# Patient Record
Sex: MALE | Race: WHITE | NOT HISPANIC OR LATINO | Employment: FULL TIME
[De-identification: names, ages, dates, MRNs, and addresses within clinical notes are randomized per-mention and may not be internally consistent; named-entity substitution may affect disease eponyms.]

---

## 2017-04-04 ENCOUNTER — HOSPITAL (OUTPATIENT)
Dept: OTHER | Age: 32
End: 2017-04-04
Attending: EMERGENCY MEDICINE

## 2017-04-05 ENCOUNTER — DIAGNOSTIC TRANS (OUTPATIENT)
Dept: OTHER | Age: 32
End: 2017-04-05

## 2017-05-03 ENCOUNTER — TELEPHONE (OUTPATIENT)
Dept: NEPHROLOGY | Facility: CLINIC | Age: 32
End: 2017-05-03

## 2017-05-05 ENCOUNTER — LAB ENCOUNTER (OUTPATIENT)
Dept: LAB | Facility: HOSPITAL | Age: 32
End: 2017-05-05
Attending: INTERNAL MEDICINE
Payer: COMMERCIAL

## 2017-05-05 ENCOUNTER — OFFICE VISIT (OUTPATIENT)
Dept: NEPHROLOGY | Facility: CLINIC | Age: 32
End: 2017-05-05

## 2017-05-05 ENCOUNTER — HOSPITAL ENCOUNTER (OUTPATIENT)
Dept: CT IMAGING | Facility: HOSPITAL | Age: 32
Discharge: HOME | End: 2017-05-05
Attending: INTERNAL MEDICINE
Payer: COMMERCIAL

## 2017-05-05 VITALS
SYSTOLIC BLOOD PRESSURE: 126 MMHG | DIASTOLIC BLOOD PRESSURE: 77 MMHG | WEIGHT: 186.81 LBS | HEIGHT: 73 IN | BODY MASS INDEX: 24.76 KG/M2 | HEART RATE: 61 BPM | TEMPERATURE: 98 F

## 2017-05-05 DIAGNOSIS — R10.84 GENERALIZED ABDOMINAL PAIN: Primary | ICD-10-CM

## 2017-05-05 DIAGNOSIS — R10.84 GENERALIZED ABDOMINAL PAIN: ICD-10-CM

## 2017-05-05 DIAGNOSIS — G40.909 SEIZURE DISORDER (HCC): ICD-10-CM

## 2017-05-05 DIAGNOSIS — G40.824 INFANTILE SPASMS WITH INTRACTABLE EPILEPSY (HCC): Primary | ICD-10-CM

## 2017-05-05 PROCEDURE — 99212 OFFICE O/P EST SF 10 MIN: CPT | Performed by: INTERNAL MEDICINE

## 2017-05-05 PROCEDURE — 80048 BASIC METABOLIC PNL TOTAL CA: CPT

## 2017-05-05 PROCEDURE — 99215 OFFICE O/P EST HI 40 MIN: CPT | Performed by: INTERNAL MEDICINE

## 2017-05-05 PROCEDURE — 36415 COLL VENOUS BLD VENIPUNCTURE: CPT

## 2017-05-05 PROCEDURE — 82565 ASSAY OF CREATININE: CPT

## 2017-05-05 PROCEDURE — 74177 CT ABD & PELVIS W/CONTRAST: CPT | Performed by: INTERNAL MEDICINE

## 2017-05-05 RX ORDER — BRIVARACETAM 50 MG/1
50 TABLET, FILM COATED ORAL NIGHTLY
Refills: 5 | COMMUNITY
Start: 2017-04-26

## 2017-05-05 RX ORDER — LEVETIRACETAM 500 MG
500 TABLET, EXTENDED RELEASE 24 HR ORAL EVERY OTHER DAY
Refills: 5 | COMMUNITY
Start: 2017-02-22 | End: 2021-04-01 | Stop reason: ALTCHOICE

## 2017-05-05 NOTE — PATIENT INSTRUCTIONS
1. Do blood test  Non fasting     and cat scan of abdomen. .   Do tonight or tomorrow am     call me four hours later for results.       [phone]

## 2017-05-11 NOTE — PROGRESS NOTES
NEPHROLOGY PROGRESS NOTE  Eli Rose     MRN:  [id number]   Date of Service:  5/5/17     HISTORY OF PRESENT ILLNESS: The patient is here. He is a 34-year-old white male. He has epilepsy.  He comes in with nausea, no vomiting, but not feeling well, and const do the best for the pt. . And save money to the health care system, in these times of health care crunch. Guadalupe Riedel

## 2017-05-20 ENCOUNTER — TELEPHONE (OUTPATIENT)
Dept: NEPHROLOGY | Facility: CLINIC | Age: 32
End: 2017-05-20

## 2017-05-21 NOTE — TELEPHONE ENCOUNTER
Notified of low sodium,  It is due to seizure meds. Erica Meng  He will discuss w his neurologist.. Bowels much improved

## 2017-06-19 ENCOUNTER — APPOINTMENT (OUTPATIENT)
Dept: LAB | Facility: HOSPITAL | Age: 32
End: 2017-06-19
Attending: INTERNAL MEDICINE
Payer: COMMERCIAL

## 2017-06-19 ENCOUNTER — OFFICE VISIT (OUTPATIENT)
Dept: NEPHROLOGY | Facility: CLINIC | Age: 32
End: 2017-06-19

## 2017-06-19 VITALS
BODY MASS INDEX: 25.27 KG/M2 | WEIGHT: 190.63 LBS | SYSTOLIC BLOOD PRESSURE: 127 MMHG | DIASTOLIC BLOOD PRESSURE: 79 MMHG | HEIGHT: 73 IN | HEART RATE: 64 BPM

## 2017-06-19 DIAGNOSIS — E87.1 HYPONATREMIA: Primary | ICD-10-CM

## 2017-06-19 DIAGNOSIS — E87.1 HYPONATREMIA: ICD-10-CM

## 2017-06-19 DIAGNOSIS — G40.909 NONINTRACTABLE EPILEPSY WITHOUT STATUS EPILEPTICUS, UNSPECIFIED EPILEPSY TYPE (HCC): ICD-10-CM

## 2017-06-19 PROCEDURE — 36415 COLL VENOUS BLD VENIPUNCTURE: CPT

## 2017-06-19 PROCEDURE — 99213 OFFICE O/P EST LOW 20 MIN: CPT | Performed by: INTERNAL MEDICINE

## 2017-06-19 PROCEDURE — 99212 OFFICE O/P EST SF 10 MIN: CPT | Performed by: INTERNAL MEDICINE

## 2017-06-19 PROCEDURE — 80048 BASIC METABOLIC PNL TOTAL CA: CPT

## 2017-06-20 NOTE — PROGRESS NOTES
NEPHROLOGY PROGRESS NOTE  Carmelina De La Rosa     MRN:  [id number]   Date of Service:  6/19/17     HISTORY OF PRESENT ILLNESS: The patient is a 48-year-old white male with epilepsy. He is doing well. He is now off Keppra, which has helped.  He is currently taking Tr

## 2017-06-21 ENCOUNTER — TELEPHONE (OUTPATIENT)
Dept: NEPHROLOGY | Facility: CLINIC | Age: 32
End: 2017-06-21

## 2017-06-21 NOTE — TELEPHONE ENCOUNTER
Contacted pt and advised him to remain on salt tablets once daily since his sodium level was WNL at 136 per [facility]. He stated understanding.

## 2017-07-24 ENCOUNTER — TELEPHONE (OUTPATIENT)
Dept: NEPHROLOGY | Facility: CLINIC | Age: 32
End: 2017-07-24

## 2017-07-24 NOTE — TELEPHONE ENCOUNTER
Patient was seen by Dr. Yesi Molina on 5/5/17 and CT was done on 5/5/17. I dont think a Pre Cert was every initiated by this office. The nursing staff was probably not informed that patient was having this test done.  We would only know if Insurance verification

## 2017-07-24 NOTE — TELEPHONE ENCOUNTER
Olivia/Pt Accounts at [address] [place] [address] called re: prior auth for 5/5/17 CT. She states that prior auth still shows as \"open\". Please call.

## 2017-07-25 NOTE — TELEPHONE ENCOUNTER
This was an emergency ct scan for abd pain at end of day.   Nursing had already left their station and I didn't know about PA

## 2017-07-25 NOTE — TELEPHONE ENCOUNTER
Spoke to Vijaya in billing.  CT scan was done without pre certification and insurance is not paying for the test. There needs to be a reason why patient had this test done the same day he was seen in the office but there is no office note indicating that t

## 2017-07-27 NOTE — TELEPHONE ENCOUNTER
[address] insurance contacted to initiate a retro certification for CT abdomen + pelvis done on 5/5/17. Insurance requesing clinical information. Do you want to addend the progress note to include more details about the symptoms?

## 2017-11-22 ENCOUNTER — OFFICE VISIT (OUTPATIENT)
Dept: NEPHROLOGY | Facility: CLINIC | Age: 32
End: 2017-11-22

## 2017-11-22 VITALS
BODY MASS INDEX: 25.87 KG/M2 | SYSTOLIC BLOOD PRESSURE: 117 MMHG | HEIGHT: 73 IN | HEART RATE: 71 BPM | DIASTOLIC BLOOD PRESSURE: 71 MMHG | WEIGHT: 195.19 LBS

## 2017-11-22 DIAGNOSIS — E87.1 HYPONATREMIA: Primary | ICD-10-CM

## 2017-11-22 PROCEDURE — 99212 OFFICE O/P EST SF 10 MIN: CPT | Performed by: INTERNAL MEDICINE

## 2017-11-22 NOTE — PROGRESS NOTES
Annie Franks is here for follow-up he has history of epilepsy and hyponatremia. I reviewed his labs and his sodium is excellent at 139 creatinine 0.86.   He had a workup for hematuria in the past everything was negative and his renal functions fine denies blood in

## 2017-12-27 ENCOUNTER — OFFICE VISIT (OUTPATIENT)
Dept: INTERNAL MEDICINE CLINIC | Facility: CLINIC | Age: 32
End: 2017-12-27

## 2017-12-27 VITALS
SYSTOLIC BLOOD PRESSURE: 105 MMHG | BODY MASS INDEX: 25.05 KG/M2 | HEART RATE: 61 BPM | DIASTOLIC BLOOD PRESSURE: 69 MMHG | HEIGHT: 73 IN | TEMPERATURE: 98 F | WEIGHT: 189 LBS

## 2017-12-27 DIAGNOSIS — Z02.1 PHYSICAL EXAM, PRE-EMPLOYMENT: Primary | ICD-10-CM

## 2017-12-27 DIAGNOSIS — G40.909 SEIZURE DISORDER (HCC): ICD-10-CM

## 2017-12-27 PROCEDURE — 99385 PREV VISIT NEW AGE 18-39: CPT | Performed by: INTERNAL MEDICINE

## 2017-12-27 NOTE — PROGRESS NOTES
Patient ID: Flavia Fields is a 34year old male. Patient presents with:  Physical: Form needed for work. HISTORY OF PRESENT ILLNESS:   HPI  Patient presents for above.   Here for preemployment physical.  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

## 2017-12-27 NOTE — PATIENT INSTRUCTIONS
Living Well with Epilepsy  People with epilepsy can lead healthy, productive lives. Life with epilepsy can be challenging, but there are things you can do to make it easier. For example, you can pay attention to your emotions.  If you feel down, upset, or Epilepsy affects those around you, too. Talk with your loved ones and learn their concerns. For instance, your children may be afraid for your safety. Reassure them that you can live a long, healthy life with epilepsy.  Your partner may wonder if a normal s

## 2018-12-26 ENCOUNTER — HOSPITAL (OUTPATIENT)
Dept: OTHER | Age: 33
End: 2018-12-26
Attending: INTERNAL MEDICINE

## 2018-12-26 LAB
ALBUMIN SERPL-MCNC: 3.9 GM/DL (ref 3.6–5.1)
ALBUMIN/GLOB SERPL: 1.1 {RATIO} (ref 1–2.4)
ALP SERPL-CCNC: 79 UNIT/L (ref 45–117)
ALT SERPL-CCNC: 25 UNIT/L
ANALYZER ANC (IANC): ABNORMAL
ANION GAP SERPL CALC-SCNC: 15 MMOL/L (ref 10–20)
AST SERPL-CCNC: 22 UNIT/L
BASOPHILS # BLD: 0 THOUSAND/MCL (ref 0–0.3)
BASOPHILS NFR BLD: 0 %
BILIRUB SERPL-MCNC: 0.6 MG/DL (ref 0.2–1)
BUN SERPL-MCNC: 15 MG/DL (ref 6–20)
BUN/CREAT SERPL: 16 (ref 7–25)
CALCIUM SERPL-MCNC: 8.6 MG/DL (ref 8.4–10.2)
CHLORIDE: 102 MMOL/L (ref 98–107)
CO2 SERPL-SCNC: 26 MMOL/L (ref 21–32)
CREAT SERPL-MCNC: 0.94 MG/DL (ref 0.67–1.17)
DIFFERENTIAL METHOD BLD: ABNORMAL
EOSINOPHIL # BLD: 0.1 THOUSAND/MCL (ref 0.1–0.5)
EOSINOPHIL NFR BLD: 1 %
ERYTHROCYTE [DISTWIDTH] IN BLOOD: 11.9 % (ref 11–15)
GLOBULIN SER-MCNC: 3.6 GM/DL (ref 2–4)
GLUCOSE SERPL-MCNC: 112 MG/DL (ref 65–99)
HEMATOCRIT: 45.8 % (ref 39–51)
HGB BLD-MCNC: 15.8 GM/DL (ref 13–17)
IMM GRANULOCYTES # BLD AUTO: 0 THOUSAND/MCL (ref 0–0.2)
IMM GRANULOCYTES NFR BLD: 0 %
LIPASE SERPL-CCNC: 114 UNIT/L (ref 73–393)
LYMPHOCYTES # BLD: 0.3 THOUSAND/MCL (ref 1–4.8)
LYMPHOCYTES NFR BLD: 3 %
MCH RBC QN AUTO: 31.2 PG (ref 26–34)
MCHC RBC AUTO-ENTMCNC: 34.5 GM/DL (ref 32–36.5)
MCV RBC AUTO: 90.5 FL (ref 78–100)
MONOCYTES # BLD: 0.6 THOUSAND/MCL (ref 0.3–0.9)
MONOCYTES NFR BLD: 6 %
NEUTROPHILS # BLD: 8.2 THOUSAND/MCL (ref 1.8–7.7)
NEUTROPHILS NFR BLD: 90 %
NEUTS SEG NFR BLD: ABNORMAL %
NRBC (NRBCRE): 0 /100 WBC
PLATELET # BLD: 147 THOUSAND/MCL (ref 140–450)
POTASSIUM SERPL-SCNC: 4.2 MMOL/L (ref 3.4–5.1)
PROT SERPL-MCNC: 7.5 GM/DL (ref 6.4–8.2)
RBC # BLD: 5.06 MILLION/MCL (ref 4.5–5.9)
SODIUM SERPL-SCNC: 139 MMOL/L (ref 135–145)
WBC # BLD: 9.2 THOUSAND/MCL (ref 4.2–11)

## 2018-12-27 ENCOUNTER — HOSPITAL (OUTPATIENT)
Dept: OTHER | Age: 33
End: 2018-12-27

## 2018-12-27 ENCOUNTER — DIAGNOSTIC TRANS (OUTPATIENT)
Dept: OTHER | Age: 33
End: 2018-12-27

## 2018-12-27 LAB
ALBUMIN SERPL-MCNC: 2.7 GM/DL (ref 3.6–5.1)
ALBUMIN/GLOB SERPL: 0.9 {RATIO} (ref 1–2.4)
ALP SERPL-CCNC: 63 UNIT/L (ref 45–117)
ALT SERPL-CCNC: 17 UNIT/L
ANALYZER ANC (IANC): ABNORMAL
ANION GAP SERPL CALC-SCNC: 12 MMOL/L (ref 10–20)
AST SERPL-CCNC: 14 UNIT/L
BASOPHILS # BLD: 0 THOUSAND/MCL (ref 0–0.3)
BASOPHILS NFR BLD: 0 %
BILIRUB SERPL-MCNC: 0.5 MG/DL (ref 0.2–1)
BUN SERPL-MCNC: 8 MG/DL (ref 6–20)
BUN/CREAT SERPL: 10 (ref 7–25)
CALCIUM SERPL-MCNC: 8.1 MG/DL (ref 8.4–10.2)
CHLORIDE: 103 MMOL/L (ref 98–107)
CO2 SERPL-SCNC: 26 MMOL/L (ref 21–32)
CREAT SERPL-MCNC: 0.82 MG/DL (ref 0.67–1.17)
DIFFERENTIAL METHOD BLD: ABNORMAL
EOSINOPHIL # BLD: 0 THOUSAND/MCL (ref 0.1–0.5)
EOSINOPHIL NFR BLD: 1 %
ERYTHROCYTE [DISTWIDTH] IN BLOOD: 11.9 % (ref 11–15)
GLOBULIN SER-MCNC: 3.1 GM/DL (ref 2–4)
GLUCOSE SERPL-MCNC: 114 MG/DL (ref 65–99)
HEMATOCRIT: 39.7 % (ref 39–51)
HGB BLD-MCNC: 13.4 GM/DL (ref 13–17)
IMM GRANULOCYTES # BLD AUTO: 0 THOUSAND/MCL (ref 0–0.2)
IMM GRANULOCYTES NFR BLD: 0 %
LYMPHOCYTES # BLD: 0.8 THOUSAND/MCL (ref 1–4.8)
LYMPHOCYTES NFR BLD: 24 %
MAGNESIUM SERPL-MCNC: 1.8 MG/DL (ref 1.7–2.4)
MCH RBC QN AUTO: 30.7 PG (ref 26–34)
MCHC RBC AUTO-ENTMCNC: 33.8 GM/DL (ref 32–36.5)
MCV RBC AUTO: 90.8 FL (ref 78–100)
MONOCYTES # BLD: 0.4 THOUSAND/MCL (ref 0.3–0.9)
MONOCYTES NFR BLD: 13 %
NEUTROPHILS # BLD: 2.1 THOUSAND/MCL (ref 1.8–7.7)
NEUTROPHILS NFR BLD: 62 %
NEUTS SEG NFR BLD: ABNORMAL %
NRBC (NRBCRE): 0 /100 WBC
OXCARBAZEPINE (OXCARB): 23.1
PLATELET # BLD: 113 THOUSAND/MCL (ref 140–450)
POTASSIUM SERPL-SCNC: 3.4 MMOL/L (ref 3.4–5.1)
PROT SERPL-MCNC: 5.8 GM/DL (ref 6.4–8.2)
RBC # BLD: 4.37 MILLION/MCL (ref 4.5–5.9)
SODIUM SERPL-SCNC: 138 MMOL/L (ref 135–145)
WBC # BLD: 3.5 THOUSAND/MCL (ref 4.2–11)
WBC STL QL MICRO: POSITIVE

## 2019-09-20 ENCOUNTER — HOSPITAL (OUTPATIENT)
Dept: OTHER | Age: 34
End: 2019-09-20
Attending: EMERGENCY MEDICINE

## 2020-01-08 ENCOUNTER — HOSPITAL (OUTPATIENT)
Dept: OTHER | Age: 35
End: 2020-01-08
Attending: EMERGENCY MEDICINE

## 2020-03-11 ENCOUNTER — WALK IN (OUTPATIENT)
Dept: URGENT CARE | Age: 35
End: 2020-03-11
Attending: EMERGENCY MEDICINE

## 2020-03-11 DIAGNOSIS — J02.0 STREP THROAT: Primary | ICD-10-CM

## 2020-03-11 LAB
FLUAV AG UPPER RESP QL IA.RAPID: NEGATIVE
FLUBV AG UPPER RESP QL IA.RAPID: NEGATIVE
INTERNAL PROCEDURAL CONTROLS ACCEPTABLE: YES
S PYO AG THROAT QL IA.RAPID: POSITIVE

## 2020-03-11 PROCEDURE — 87804 INFLUENZA ASSAY W/OPTIC: CPT | Performed by: EMERGENCY MEDICINE

## 2020-03-11 PROCEDURE — 87880 STREP A ASSAY W/OPTIC: CPT | Performed by: EMERGENCY MEDICINE

## 2020-03-11 PROCEDURE — 99212 OFFICE O/P EST SF 10 MIN: CPT

## 2020-03-11 RX ORDER — LORAZEPAM 0.5 MG/1
0.5 TABLET ORAL
COMMUNITY
Start: 2016-04-05

## 2020-03-11 RX ORDER — CLINDAMYCIN HYDROCHLORIDE 300 MG/1
300 CAPSULE ORAL 3 TIMES DAILY
Qty: 30 CAPSULE | Refills: 0 | Status: SHIPPED | OUTPATIENT
Start: 2020-03-11 | End: 2020-03-21

## 2020-03-11 ASSESSMENT — PAIN SCALES - GENERAL: PAINLEVEL: 5-6

## 2020-03-13 ENCOUNTER — HOSPITAL ENCOUNTER (EMERGENCY)
Age: 35
Discharge: HOME | End: 2020-03-13
Attending: EMERGENCY MEDICINE

## 2020-03-13 ENCOUNTER — HOSPITAL ENCOUNTER (OUTPATIENT)
Dept: GENERAL RADIOLOGY | Age: 35
Discharge: HOME | End: 2020-03-13
Attending: EMERGENCY MEDICINE

## 2020-03-13 ENCOUNTER — WALK IN (OUTPATIENT)
Dept: URGENT CARE | Age: 35
End: 2020-03-13
Attending: NURSE PRACTITIONER

## 2020-03-13 VITALS
SYSTOLIC BLOOD PRESSURE: 138 MMHG | RESPIRATION RATE: 16 BRPM | HEART RATE: 69 BPM | BODY MASS INDEX: 25.1 KG/M2 | WEIGHT: 195.55 LBS | HEIGHT: 74 IN | DIASTOLIC BLOOD PRESSURE: 74 MMHG | TEMPERATURE: 98.9 F | OXYGEN SATURATION: 99 %

## 2020-03-13 DIAGNOSIS — J18.9 PNEUMONIA OF RIGHT UPPER LOBE DUE TO INFECTIOUS ORGANISM: Primary | ICD-10-CM

## 2020-03-13 DIAGNOSIS — J18.9 PNEUMONIA, UNSPECIFIED ORGANISM: Primary | ICD-10-CM

## 2020-03-13 DIAGNOSIS — R05.9 COUGH: ICD-10-CM

## 2020-03-13 LAB
ANION GAP SERPL CALC-SCNC: 9 MMOL/L (ref 10–20)
BASOPHILS # BLD: 0 K/MCL (ref 0–0.3)
BASOPHILS NFR BLD: 0 %
BUN SERPL-MCNC: 8 MG/DL (ref 6–20)
BUN/CREAT SERPL: 11 (ref 7–25)
CALCIUM SERPL-MCNC: 8.7 MG/DL (ref 8.4–10.2)
CHLORIDE SERPL-SCNC: 102 MMOL/L (ref 98–107)
CO2 SERPL-SCNC: 28 MMOL/L (ref 21–32)
CREAT SERPL-MCNC: 0.73 MG/DL (ref 0.67–1.17)
DIFFERENTIAL METHOD BLD: ABNORMAL
EOSINOPHIL # BLD: 0.2 K/MCL (ref 0.1–0.5)
EOSINOPHIL NFR BLD: 2 %
ERYTHROCYTE [DISTWIDTH] IN BLOOD: 12.1 % (ref 11–15)
FLUAV RNA SPEC QL NAA+PROBE: NOT DETECTED
FLUBV RNA SPEC QL NAA+PROBE: NOT DETECTED
GLUCOSE SERPL-MCNC: 139 MG/DL (ref 65–99)
HCT VFR BLD CALC: 38.6 % (ref 39–51)
HGB BLD-MCNC: 13 G/DL (ref 13–17)
IMM GRANULOCYTES # BLD AUTO: 0 K/MCL (ref 0–0.2)
IMM GRANULOCYTES NFR BLD: 0 %
LYMPHOCYTES # BLD: 1.2 K/MCL (ref 1–4.8)
LYMPHOCYTES NFR BLD: 15 %
MAGNESIUM SERPL-MCNC: 2.2 MG/DL (ref 1.7–2.4)
MCH RBC QN AUTO: 30.2 PG (ref 26–34)
MCHC RBC AUTO-ENTMCNC: 33.7 G/DL (ref 32–36.5)
MCV RBC AUTO: 89.6 FL (ref 78–100)
MONOCYTES # BLD: 1.2 K/MCL (ref 0.3–0.9)
MONOCYTES NFR BLD: 15 %
NEUTROPHILS # BLD: 5.2 K/MCL (ref 1.8–7.7)
NEUTROPHILS NFR BLD: 68 %
NRBC BLD MANUAL-RTO: 0 /100 WBC
PLATELET # BLD: 179 K/MCL (ref 140–450)
POTASSIUM SERPL-SCNC: 3.8 MMOL/L (ref 3.4–5.1)
PROCALCITONIN SERPL IA-MCNC: <0.05 NG/ML
RBC # BLD: 4.31 MIL/MCL (ref 4.5–5.9)
SODIUM SERPL-SCNC: 135 MMOL/L (ref 135–145)
SPECIMEN SOURCE: NORMAL
WBC # BLD: 7.7 K/MCL (ref 4.2–11)

## 2020-03-13 PROCEDURE — 84145 PROCALCITONIN (PCT): CPT

## 2020-03-13 PROCEDURE — 96361 HYDRATE IV INFUSION ADD-ON: CPT

## 2020-03-13 PROCEDURE — 80048 BASIC METABOLIC PNL TOTAL CA: CPT

## 2020-03-13 PROCEDURE — 10002807 HB RX 258: Performed by: EMERGENCY MEDICINE

## 2020-03-13 PROCEDURE — 99284 EMERGENCY DEPT VISIT MOD MDM: CPT

## 2020-03-13 PROCEDURE — 85025 COMPLETE CBC W/AUTO DIFF WBC: CPT

## 2020-03-13 PROCEDURE — 83735 ASSAY OF MAGNESIUM: CPT

## 2020-03-13 PROCEDURE — 99214 OFFICE O/P EST MOD 30 MIN: CPT

## 2020-03-13 PROCEDURE — 71046 X-RAY EXAM CHEST 2 VIEWS: CPT

## 2020-03-13 PROCEDURE — 87502 INFLUENZA DNA AMP PROBE: CPT

## 2020-03-13 PROCEDURE — 93005 ELECTROCARDIOGRAM TRACING: CPT | Performed by: EMERGENCY MEDICINE

## 2020-03-13 PROCEDURE — 87040 BLOOD CULTURE FOR BACTERIA: CPT

## 2020-03-13 PROCEDURE — 10002803 HB RX 637: Performed by: EMERGENCY MEDICINE

## 2020-03-13 PROCEDURE — 96360 HYDRATION IV INFUSION INIT: CPT

## 2020-03-13 RX ORDER — IBUPROFEN 600 MG/1
600 TABLET ORAL ONCE
Status: COMPLETED | OUTPATIENT
Start: 2020-03-13 | End: 2020-03-13

## 2020-03-13 RX ORDER — CEFUROXIME AXETIL 500 MG/1
500 TABLET ORAL 2 TIMES DAILY
Qty: 18 TABLET | Refills: 0 | Status: SHIPPED | OUTPATIENT
Start: 2020-03-13 | End: 2020-03-22

## 2020-03-13 RX ADMIN — IBUPROFEN 600 MG: 600 TABLET, FILM COATED ORAL at 13:46

## 2020-03-13 RX ADMIN — SODIUM CHLORIDE 1000 ML: 0.9 INJECTION, SOLUTION INTRAVENOUS at 15:45

## 2020-03-13 SDOH — HEALTH STABILITY: MENTAL HEALTH: HOW OFTEN DO YOU HAVE A DRINK CONTAINING ALCOHOL?: NEVER

## 2020-03-13 ASSESSMENT — PAIN SCALES - GENERAL
PAINLEVEL_OUTOF10: 5
PAINLEVEL: 5-6

## 2020-03-13 ASSESSMENT — ENCOUNTER SYMPTOMS
DIZZINESS: 0
FEVER: 1
AGITATION: 0
FEVER: 0
NUMBNESS: 0
EYE PAIN: 0
HALLUCINATIONS: 0
SORE THROAT: 0
EYE REDNESS: 0
COUGH: 1
DIARRHEA: 1
SHORTNESS OF BREATH: 0
BRUISES/BLEEDS EASILY: 0
ACTIVITY CHANGE: 0
HEADACHES: 0
COUGH: 1
ABDOMINAL PAIN: 0
BACK PAIN: 0
ABDOMINAL DISTENTION: 0
DIZZINESS: 1
RHINORRHEA: 0
COLOR CHANGE: 0
CHILLS: 0
ADENOPATHY: 0
ABDOMINAL PAIN: 0
VOMITING: 0
APNEA: 0
WEAKNESS: 0
BACK PAIN: 0

## 2020-03-13 ASSESSMENT — PAIN DESCRIPTION - PAIN TYPE: TYPE: ACUTE PAIN

## 2020-03-14 LAB
ATRIAL RATE (BPM): 68
P AXIS (DEGREES): 41
PR-INTERVAL (MSEC): 148
QRS-INTERVAL (MSEC): 84
QT-INTERVAL (MSEC): 388
QTC: 413
R AXIS (DEGREES): 51
REPORT TEXT: NORMAL
T AXIS (DEGREES): 32
VENTRICULAR RATE EKG/MIN (BPM): 68

## 2020-03-16 ENCOUNTER — TELEPHONE (OUTPATIENT)
Dept: NEPHROLOGY | Facility: CLINIC | Age: 35
End: 2020-03-16

## 2020-03-16 NOTE — TELEPHONE ENCOUNTER
Pt states he was in the ER at [facility] on Friday and discharged the same day. Pt requesting an appt this week with Dr. Jeremy Lilly.  Please call [phone]

## 2020-03-18 ENCOUNTER — OFFICE VISIT (OUTPATIENT)
Dept: NEPHROLOGY | Facility: CLINIC | Age: 35
End: 2020-03-18
Payer: COMMERCIAL

## 2020-03-18 VITALS
BODY MASS INDEX: 26 KG/M2 | HEART RATE: 74 BPM | TEMPERATURE: 97 F | WEIGHT: 197.63 LBS | DIASTOLIC BLOOD PRESSURE: 80 MMHG | SYSTOLIC BLOOD PRESSURE: 141 MMHG

## 2020-03-18 DIAGNOSIS — J18.9 PNEUMONIA DUE TO INFECTIOUS ORGANISM, UNSPECIFIED LATERALITY, UNSPECIFIED PART OF LUNG: Primary | ICD-10-CM

## 2020-03-18 LAB
BACTERIA BLD CULT: NORMAL
BACTERIA BLD CULT: NORMAL
REPORT STATUS (RPT): NORMAL
REPORT STATUS (RPT): NORMAL
SPECIMEN SOURCE: NORMAL
SPECIMEN SOURCE: NORMAL

## 2020-03-18 PROCEDURE — 99214 OFFICE O/P EST MOD 30 MIN: CPT | Performed by: INTERNAL MEDICINE

## 2020-03-18 PROCEDURE — 99212 OFFICE O/P EST SF 10 MIN: CPT | Performed by: INTERNAL MEDICINE

## 2020-03-18 RX ORDER — CEFUROXIME AXETIL 500 MG/1
500 TABLET ORAL 2 TIMES DAILY
COMMUNITY
Start: 2020-03-13 | End: 2020-03-22

## 2020-03-18 NOTE — PATIENT INSTRUCTIONS
Please self isolate until Saturday or Sunday    Do not have any contact with any people except family members that have already been exposed    Do not invite anyone over    I am not sure if you had coronavirus it is possible but please finish your antibiot

## 2020-03-19 NOTE — PROGRESS NOTES
Byron Luna is here as a history of renal insufficiency and seizure disorder he is here he just got over about a days of being sick with fever up to 102 was seen in the ER had a rapid strep that was positive was placed on clindamycin felt better but fever continu

## 2020-10-05 ENCOUNTER — TELEPHONE (OUTPATIENT)
Dept: NEPHROLOGY | Facility: CLINIC | Age: 35
End: 2020-10-05

## 2020-10-05 NOTE — TELEPHONE ENCOUNTER
Pt called to speak to Dr. Aleja Leonard about a note that he needs to return to work. He states he was tested for Covid because of possible exposure. Test was negative but he needs note. Please call.

## 2020-10-05 NOTE — TELEPHONE ENCOUNTER
Patient contacted. 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 His Covid-19 test came back negative so he wants to return to work on 10/12/2020.  Work release note faxed to [employer] at

## 2020-11-05 ENCOUNTER — TELEPHONE (OUTPATIENT)
Dept: NEPHROLOGY | Facility: CLINIC | Age: 35
End: 2020-11-05

## 2020-11-05 DIAGNOSIS — Z20.822 CLOSE EXPOSURE TO COVID-19 VIRUS: Primary | ICD-10-CM

## 2020-11-05 NOTE — TELEPHONE ENCOUNTER
Pt asking for order for covid test - had exposure at his work on Monday - no sx , but pt has epilepsy

## 2020-11-06 ENCOUNTER — TELEPHONE (OUTPATIENT)
Dept: NEPHROLOGY | Facility: CLINIC | Age: 35
End: 2020-11-06

## 2020-11-06 NOTE — TELEPHONE ENCOUNTER
Pt had covid test done today and was told results will be in on Tuesday - asking if he needs to quarantine until he gets the results

## 2020-11-10 NOTE — TELEPHONE ENCOUNTER
Patient contacted. He is aware of Video visit with Dr. Chris Osborne scheduled for Wednesday 11/11/2020 after 6PM. He states he has self quarentened and will need a note to return to work. His test done at [facility] office was negative.

## 2020-11-10 NOTE — TELEPHONE ENCOUNTER
Pt requesting a call back regarding covid test results, issues with school and quarantine questions. Pt states situation at school has caused  An increase in anxiety and epileptic auras. unable to reach RN  Please call [phone] or cell [phone]

## 2020-11-11 ENCOUNTER — TELEMEDICINE (OUTPATIENT)
Dept: NEPHROLOGY | Facility: CLINIC | Age: 35
End: 2020-11-11
Payer: COMMERCIAL

## 2020-11-11 DIAGNOSIS — G40.909 NONINTRACTABLE EPILEPSY WITHOUT STATUS EPILEPTICUS, UNSPECIFIED EPILEPSY TYPE (HCC): ICD-10-CM

## 2020-11-11 DIAGNOSIS — G40.909 SEIZURE DISORDER (HCC): ICD-10-CM

## 2020-11-11 DIAGNOSIS — Z20.822 CLOSE EXPOSURE TO COVID-19 VIRUS: Primary | ICD-10-CM

## 2020-11-11 PROCEDURE — 99213 OFFICE O/P EST LOW 20 MIN: CPT | Performed by: INTERNAL MEDICINE

## 2020-11-12 ENCOUNTER — PATIENT MESSAGE (OUTPATIENT)
Dept: NEPHROLOGY | Facility: CLINIC | Age: 35
End: 2020-11-12

## 2020-11-12 ENCOUNTER — TELEPHONE (OUTPATIENT)
Dept: NEPHROLOGY | Facility: CLINIC | Age: 35
End: 2020-11-12

## 2020-11-12 NOTE — TELEPHONE ENCOUNTER
From: Lena Linder  To: Cy Alcala MD  Sent: 11/12/2020 12:45 PM CST  Subject: Visit Follow-up Question    Dr. Alexsandra Alcala,     I would like to thank you for sitting down with me yesterday!  Due to the rise in Covid-19 cases in the area, due to having fam

## 2020-11-12 NOTE — PROGRESS NOTES
Virtual Telephone Check-In    Sunni Emerson verballyCONSENTS TO Sydnie Nunez/Telephone Check-In visit on 11/11/20. Patient has been referred to the [facility] website at [email or web address] to review the yearly Consent to Treat document.

## 2020-11-27 ENCOUNTER — TELEPHONE (OUTPATIENT)
Dept: NEPHROLOGY | Facility: CLINIC | Age: 35
End: 2020-11-27

## 2020-11-27 DIAGNOSIS — Z20.822 SUSPECTED COVID-19 VIRUS INFECTION: Primary | ICD-10-CM

## 2020-11-27 NOTE — TELEPHONE ENCOUNTER
Called and informed the patient COVID19 test has been ordered. Provided number for central scheduling. Addressed other questions. Encounter closed.

## 2020-11-27 NOTE — TELEPHONE ENCOUNTER
Patient requesting COVID19 test. His mother whom he lives with just tested positive. He is having symptoms of headache and runny nose. Order pending. Called and spoke with Paul Carrillo. He states his mother recently tested positive for [address].  He currently is

## 2020-11-27 NOTE — TELEPHONE ENCOUNTER
Patient states mom tested positive for COVID19 this morning and requesting orders for test.  Please call. Thank you.

## 2020-11-30 ENCOUNTER — APPOINTMENT (OUTPATIENT)
Dept: LAB | Facility: HOSPITAL | Age: 35
End: 2020-11-30
Attending: INTERNAL MEDICINE
Payer: COMMERCIAL

## 2020-11-30 DIAGNOSIS — Z20.822 SUSPECTED COVID-19 VIRUS INFECTION: ICD-10-CM

## 2021-03-22 ENCOUNTER — TELEPHONE (OUTPATIENT)
Dept: NEPHROLOGY | Facility: CLINIC | Age: 36
End: 2021-03-22

## 2021-03-22 NOTE — TELEPHONE ENCOUNTER
pt. requesting to speak to the nurse, as he is concerned about having blood in his urine and he has questions.

## 2021-03-22 NOTE — TELEPHONE ENCOUNTER
Dr. Pillai Reading with patient. States he saw blood in his urine Saturday and has been noticing it for on/off for two weeks. Today 3/22 pt states he doesn't have blood in his urine. Denies fever, abd pain, body chills. States he experienced pain urinating on Saturday. States urine was a brown \"iced tea\" color. Pt has hx epilepsy and states he is concerned with kidney function and his medications. Pt requesting to be seen in office. There is an available opening today at 4:40pm. Urged pt to go to ER if fever, chills, other symptoms develop. Do you want me to schedule this patient? Thank you!

## 2021-03-22 NOTE — TELEPHONE ENCOUNTER
Please book patient for video visit with me  4 tomorrow  Between 4 and 5 PM thank you  Please make sure he gets in the schedule

## 2021-03-23 ENCOUNTER — TELEMEDICINE (OUTPATIENT)
Dept: NEPHROLOGY | Facility: CLINIC | Age: 36
End: 2021-03-23
Payer: COMMERCIAL

## 2021-03-23 DIAGNOSIS — R31.21 ASYMPTOMATIC MICROSCOPIC HEMATURIA: ICD-10-CM

## 2021-03-23 DIAGNOSIS — G40.909 SEIZURE DISORDER (HCC): Primary | ICD-10-CM

## 2021-03-23 PROCEDURE — 99213 OFFICE O/P EST LOW 20 MIN: CPT | Performed by: INTERNAL MEDICINE

## 2021-03-23 NOTE — PROGRESS NOTES
Virtual Telephone Check-In    Yehuda Townsend verbally consents to a video doximity  Virtual/Telephone Check-In visit on 03/23/21. Patient has been referred to the [facility] website at [email or web address] to review the yearly Consent to Treat document.     P

## 2021-03-24 ENCOUNTER — LAB ENCOUNTER (OUTPATIENT)
Dept: LAB | Facility: HOSPITAL | Age: 36
End: 2021-03-24
Attending: INTERNAL MEDICINE
Payer: COMMERCIAL

## 2021-03-24 DIAGNOSIS — G40.909 EPILEPSY (HCC): Primary | ICD-10-CM

## 2021-03-24 DIAGNOSIS — R31.21 ASYMPTOMATIC MICROSCOPIC HEMATURIA: ICD-10-CM

## 2021-03-24 DIAGNOSIS — G40.909 SEIZURE DISORDER (HCC): ICD-10-CM

## 2021-03-24 LAB
ALBUMIN SERPL-MCNC: 3.8 G/DL (ref 3.4–5)
ALBUMIN/GLOB SERPL: 1 {RATIO} (ref 1–2)
ALP LIVER SERPL-CCNC: 75 U/L
ALT SERPL-CCNC: 19 U/L
ANION GAP SERPL CALC-SCNC: 2 MMOL/L (ref 0–18)
AST SERPL-CCNC: 13 U/L (ref 15–37)
BASOPHILS # BLD AUTO: 0.03 X10(3) UL (ref 0–0.2)
BASOPHILS NFR BLD AUTO: 0.7 %
BILIRUB SERPL-MCNC: 0.4 MG/DL (ref 0.1–2)
BILIRUB UR QL: NEGATIVE
BUN BLD-MCNC: 11 MG/DL (ref 7–18)
BUN/CREAT SERPL: 12.5 (ref 10–20)
CALCIUM BLD-MCNC: 8.9 MG/DL (ref 8.5–10.1)
CHLORIDE SERPL-SCNC: 102 MMOL/L (ref 98–112)
CHOLEST SMN-MCNC: 157 MG/DL (ref ?–200)
CLARITY UR: CLEAR
CO2 SERPL-SCNC: 31 MMOL/L (ref 21–32)
COLOR UR: YELLOW
CREAT BLD-MCNC: 0.88 MG/DL
CREAT UR-SCNC: 342 MG/DL
DEPRECATED RDW RBC AUTO: 38.7 FL (ref 35.1–46.3)
EOSINOPHIL # BLD AUTO: 0.15 X10(3) UL (ref 0–0.7)
EOSINOPHIL NFR BLD AUTO: 3.5 %
ERYTHROCYTE [DISTWIDTH] IN BLOOD BY AUTOMATED COUNT: 11.7 % (ref 11–15)
GLOBULIN PLAS-MCNC: 3.8 G/DL (ref 2.8–4.4)
GLUCOSE BLD-MCNC: 92 MG/DL (ref 70–99)
GLUCOSE UR-MCNC: NEGATIVE MG/DL
HCT VFR BLD AUTO: 44 %
HDLC SERPL-MCNC: 67 MG/DL (ref 40–59)
HGB BLD-MCNC: 15.1 G/DL
HGB UR QL STRIP.AUTO: NEGATIVE
IMM GRANULOCYTES # BLD AUTO: 0.01 X10(3) UL (ref 0–1)
IMM GRANULOCYTES NFR BLD: 0.2 %
KETONES UR-MCNC: NEGATIVE MG/DL
LDLC SERPL CALC-MCNC: 67 MG/DL (ref ?–100)
LEUKOCYTE ESTERASE UR QL STRIP.AUTO: NEGATIVE
LYMPHOCYTES # BLD AUTO: 1.68 X10(3) UL (ref 1–4)
LYMPHOCYTES NFR BLD AUTO: 39.5 %
M PROTEIN MFR SERPL ELPH: 7.6 G/DL (ref 6.4–8.2)
MCH RBC QN AUTO: 31.1 PG (ref 26–34)
MCHC RBC AUTO-ENTMCNC: 34.3 G/DL (ref 31–37)
MCV RBC AUTO: 90.5 FL
MICROALBUMIN UR-MCNC: 3.37 MG/DL
MICROALBUMIN/CREAT 24H UR-RTO: 9.9 UG/MG (ref ?–30)
MONOCYTES # BLD AUTO: 0.46 X10(3) UL (ref 0.1–1)
MONOCYTES NFR BLD AUTO: 10.8 %
NEUTROPHILS # BLD AUTO: 1.92 X10 (3) UL (ref 1.5–7.7)
NEUTROPHILS # BLD AUTO: 1.92 X10(3) UL (ref 1.5–7.7)
NEUTROPHILS NFR BLD AUTO: 45.3 %
NITRITE UR QL STRIP.AUTO: NEGATIVE
NONHDLC SERPL-MCNC: 90 MG/DL (ref ?–130)
OSMOLALITY SERPL CALC.SUM OF ELEC: 279 MOSM/KG (ref 275–295)
PATIENT FASTING Y/N/NP: NO
PATIENT FASTING Y/N/NP: NO
PH UR: 5 [PH] (ref 5–8)
PLATELET # BLD AUTO: 164 10(3)UL (ref 150–450)
POTASSIUM SERPL-SCNC: 4.1 MMOL/L (ref 3.5–5.1)
PROT UR-MCNC: NEGATIVE MG/DL
RBC # BLD AUTO: 4.86 X10(6)UL
SODIUM SERPL-SCNC: 135 MMOL/L (ref 136–145)
SP GR UR STRIP: 1.03 (ref 1–1.03)
TRIGL SERPL-MCNC: 116 MG/DL (ref 30–149)
UROBILINOGEN UR STRIP-ACNC: <2
VLDLC SERPL CALC-MCNC: 23 MG/DL (ref 0–30)
WBC # BLD AUTO: 4.3 X10(3) UL (ref 4–11)

## 2021-03-24 PROCEDURE — 82570 ASSAY OF URINE CREATININE: CPT | Performed by: INTERNAL MEDICINE

## 2021-03-24 PROCEDURE — 80183 DRUG SCRN QUANT OXCARBAZEPIN: CPT

## 2021-03-24 PROCEDURE — 82043 UR ALBUMIN QUANTITATIVE: CPT | Performed by: INTERNAL MEDICINE

## 2021-03-24 PROCEDURE — 36415 COLL VENOUS BLD VENIPUNCTURE: CPT | Performed by: INTERNAL MEDICINE

## 2021-03-24 PROCEDURE — 81003 URINALYSIS AUTO W/O SCOPE: CPT | Performed by: INTERNAL MEDICINE

## 2021-03-24 PROCEDURE — 80061 LIPID PANEL: CPT | Performed by: INTERNAL MEDICINE

## 2021-03-24 PROCEDURE — 85025 COMPLETE CBC W/AUTO DIFF WBC: CPT | Performed by: INTERNAL MEDICINE

## 2021-03-24 PROCEDURE — 80053 COMPREHEN METABOLIC PANEL: CPT | Performed by: INTERNAL MEDICINE

## 2021-03-24 PROCEDURE — 87086 URINE CULTURE/COLONY COUNT: CPT

## 2021-03-26 ENCOUNTER — TELEPHONE (OUTPATIENT)
Dept: NEPHROLOGY | Facility: CLINIC | Age: 36
End: 2021-03-26

## 2021-03-26 ENCOUNTER — APPOINTMENT (OUTPATIENT)
Dept: CT IMAGING | Facility: HOSPITAL | Age: 36
End: 2021-03-26
Attending: NURSE PRACTITIONER
Payer: COMMERCIAL

## 2021-03-26 ENCOUNTER — HOSPITAL ENCOUNTER (EMERGENCY)
Facility: HOSPITAL | Age: 36
Discharge: HOME | End: 2021-03-26
Payer: COMMERCIAL

## 2021-03-26 VITALS
WEIGHT: 195 LBS | HEIGHT: 73 IN | BODY MASS INDEX: 25.84 KG/M2 | TEMPERATURE: 99 F | SYSTOLIC BLOOD PRESSURE: 130 MMHG | HEART RATE: 67 BPM | OXYGEN SATURATION: 97 % | RESPIRATION RATE: 17 BRPM | DIASTOLIC BLOOD PRESSURE: 75 MMHG

## 2021-03-26 DIAGNOSIS — R31.9 HEMATURIA, UNSPECIFIED TYPE: Primary | ICD-10-CM

## 2021-03-26 LAB
ANION GAP SERPL CALC-SCNC: 5 MMOL/L (ref 0–18)
BASOPHILS # BLD AUTO: 0.05 X10(3) UL (ref 0–0.2)
BASOPHILS NFR BLD AUTO: 0.9 %
BILIRUB UR QL: NEGATIVE
BUN BLD-MCNC: 13 MG/DL (ref 7–18)
BUN/CREAT SERPL: 13.3 (ref 10–20)
CALCIUM BLD-MCNC: 9 MG/DL (ref 8.5–10.1)
CHLORIDE SERPL-SCNC: 103 MMOL/L (ref 98–112)
CO2 SERPL-SCNC: 31 MMOL/L (ref 21–32)
COLOR UR: YELLOW
CREAT BLD-MCNC: 0.98 MG/DL
DEPRECATED RDW RBC AUTO: 38.5 FL (ref 35.1–46.3)
EOSINOPHIL # BLD AUTO: 0.13 X10(3) UL (ref 0–0.7)
EOSINOPHIL NFR BLD AUTO: 2.3 %
ERYTHROCYTE [DISTWIDTH] IN BLOOD BY AUTOMATED COUNT: 11.6 % (ref 11–15)
GLUCOSE BLD-MCNC: 92 MG/DL (ref 70–99)
GLUCOSE UR-MCNC: NEGATIVE MG/DL
HCT VFR BLD AUTO: 42.8 %
HGB BLD-MCNC: 14.6 G/DL
IMM GRANULOCYTES # BLD AUTO: 0.01 X10(3) UL (ref 0–1)
IMM GRANULOCYTES NFR BLD: 0.2 %
LEUKOCYTE ESTERASE UR QL STRIP.AUTO: NEGATIVE
LYMPHOCYTES # BLD AUTO: 1.83 X10(3) UL (ref 1–4)
LYMPHOCYTES NFR BLD AUTO: 32.5 %
MCH RBC QN AUTO: 31.1 PG (ref 26–34)
MCHC RBC AUTO-ENTMCNC: 34.1 G/DL (ref 31–37)
MCV RBC AUTO: 91.3 FL
MONOCYTES # BLD AUTO: 0.55 X10(3) UL (ref 0.1–1)
MONOCYTES NFR BLD AUTO: 9.8 %
NEUTROPHILS # BLD AUTO: 3.06 X10 (3) UL (ref 1.5–7.7)
NEUTROPHILS # BLD AUTO: 3.06 X10(3) UL (ref 1.5–7.7)
NEUTROPHILS NFR BLD AUTO: 54.3 %
NITRITE UR QL STRIP.AUTO: NEGATIVE
OSMOLALITY SERPL CALC.SUM OF ELEC: 288 MOSM/KG (ref 275–295)
PH UR: 6 [PH] (ref 5–8)
PLATELET # BLD AUTO: 154 10(3)UL (ref 150–450)
POTASSIUM SERPL-SCNC: 4.3 MMOL/L (ref 3.5–5.1)
PROT UR-MCNC: 100 MG/DL
RBC # BLD AUTO: 4.69 X10(6)UL
RBC #/AREA URNS AUTO: >10 /HPF
SODIUM SERPL-SCNC: 139 MMOL/L (ref 136–145)
SP GR UR STRIP: >1.03 (ref 1–1.03)
UROBILINOGEN UR STRIP-ACNC: <2
WBC # BLD AUTO: 5.6 X10(3) UL (ref 4–11)

## 2021-03-26 PROCEDURE — 81001 URINALYSIS AUTO W/SCOPE: CPT | Performed by: NURSE PRACTITIONER

## 2021-03-26 PROCEDURE — 85025 COMPLETE CBC W/AUTO DIFF WBC: CPT | Performed by: NURSE PRACTITIONER

## 2021-03-26 PROCEDURE — 99284 EMERGENCY DEPT VISIT MOD MDM: CPT

## 2021-03-26 PROCEDURE — 80048 BASIC METABOLIC PNL TOTAL CA: CPT | Performed by: NURSE PRACTITIONER

## 2021-03-26 PROCEDURE — 74176 CT ABD & PELVIS W/O CONTRAST: CPT | Performed by: NURSE PRACTITIONER

## 2021-03-26 PROCEDURE — 96360 HYDRATION IV INFUSION INIT: CPT

## 2021-03-26 RX ORDER — ACETAMINOPHEN 500 MG
1000 TABLET ORAL ONCE
Status: COMPLETED | OUTPATIENT
Start: 2021-03-26 | End: 2021-03-26

## 2021-03-26 NOTE — ED PROVIDER NOTES
Patient Seen in: [facility] Emergency Department      History   Patient presents with:  Urinary Symptoms    Stated Complaint: blood in urine    HPI/Subjective:   HPI    69-year-old male presents the emergency department for evaluation.   Patient  [03/26/21 1604]   /78   Pulse 74   Resp 17   Temp 98.5 °F (36.9 °C)   Temp src    SpO2 100 %   O2 Device        Current:/78   Pulse 74   Temp 98.5 °F (36.9 °C)   Resp 17   Ht 185.4 cm (6' 1\")   Wt 88.5 kg   SpO2 100%   BMI 25.73 kg/m² (*)     Blood Urine Large (*)     Protein Urine 100  (*)     RBC Urine >10 (*)     Bacteria Urine 1+ (*)     All other components within normal limits   BASIC METABOLIC PANEL (8) - Normal   CBC WITH DIFFERENTIAL WITH PLATELET    Narrative:      The followin discussed with patient and his father. Return as needed.                          Disposition and Plan     Clinical Impression:  Hematuria, unspecified type  (primary encounter diagnosis)    Disposition:  Discharge  3/26/2021  6:52 pm    Follow-up:  Aleja Leonard

## 2021-03-26 NOTE — ED NOTES
Pt states has been having intermittent urinary bleeding and intermittent clots x 1 month. States was seen by Nephrologist, Dr. Alexsandra Alcala. States having dysuria, urinary frequency and urgency as well as rt flank pain. Denies nausea/vomiting.

## 2021-03-26 NOTE — TELEPHONE ENCOUNTER
Patient indicates he had blood in urine Saturday and again today, please call at:[phone],thanks.

## 2021-03-26 NOTE — ED INITIAL ASSESSMENT (HPI)
Pt reports hematuria intermittently x 1 month, reports hematuria + passing \"clot\" today with burning.  Seeing Dr Mayco Key, blood work and urine done 3/24

## 2021-03-28 LAB — OXCARBAZEPINE METABOLITE: 24 UG/ML

## 2021-03-29 ENCOUNTER — TELEPHONE (OUTPATIENT)
Dept: NEPHROLOGY | Facility: CLINIC | Age: 36
End: 2021-03-29

## 2021-03-29 NOTE — TELEPHONE ENCOUNTER
ER told patient to be seen in 3 days after ER discharge (3/29/21) No openings until 4/8/21. Encounter routed to Dr. Yesi Molina to advise.

## 2021-03-29 NOTE — TELEPHONE ENCOUNTER
Patient called in stating that he needs to be seen within a week per his hospital follow up appt. I checked the schedule it is booked out.  Please follow up

## 2021-04-01 ENCOUNTER — OFFICE VISIT (OUTPATIENT)
Dept: NEPHROLOGY | Facility: CLINIC | Age: 36
End: 2021-04-01
Payer: COMMERCIAL

## 2021-04-01 VITALS
DIASTOLIC BLOOD PRESSURE: 73 MMHG | HEART RATE: 63 BPM | SYSTOLIC BLOOD PRESSURE: 127 MMHG | WEIGHT: 201 LBS | BODY MASS INDEX: 27.22 KG/M2 | HEIGHT: 72 IN

## 2021-04-01 DIAGNOSIS — N45.1 EPIDIDYMITIS: Primary | ICD-10-CM

## 2021-04-01 PROCEDURE — 99214 OFFICE O/P EST MOD 30 MIN: CPT | Performed by: INTERNAL MEDICINE

## 2021-04-01 PROCEDURE — 3008F BODY MASS INDEX DOCD: CPT | Performed by: INTERNAL MEDICINE

## 2021-04-01 PROCEDURE — 3078F DIAST BP <80 MM HG: CPT | Performed by: INTERNAL MEDICINE

## 2021-04-01 PROCEDURE — 3074F SYST BP LT 130 MM HG: CPT | Performed by: INTERNAL MEDICINE

## 2021-04-01 RX ORDER — CIPROFLOXACIN 500 MG/1
500 TABLET, FILM COATED ORAL 2 TIMES DAILY
Qty: 14 TABLET | Refills: 1 | Status: SHIPPED | OUTPATIENT
Start: 2021-04-01 | End: 2021-04-07

## 2021-04-01 RX ORDER — CIPROFLOXACIN 250 MG/1
250 TABLET, FILM COATED ORAL 2 TIMES DAILY
Qty: 20 TABLET | Refills: 1 | Status: SHIPPED | OUTPATIENT
Start: 2021-04-01 | End: 2021-04-01 | Stop reason: CLARIF

## 2021-04-01 NOTE — PATIENT INSTRUCTIONS
I think you have epididymitis.     Please take Cipro 500 mg twice a day  For 10 days    May take Advil or ibuprofen 200 mg every 6 hours as needed    Wear denzel valdivia    Use ice    Please see [facility] urology    Call if you can't get it

## 2021-04-02 ENCOUNTER — TELEPHONE (OUTPATIENT)
Dept: NEPHROLOGY | Facility: CLINIC | Age: 36
End: 2021-04-02

## 2021-04-02 ENCOUNTER — PATIENT MESSAGE (OUTPATIENT)
Dept: NEPHROLOGY | Facility: CLINIC | Age: 36
End: 2021-04-02

## 2021-04-02 NOTE — TELEPHONE ENCOUNTER
Patient has already sent a My Chart message to Dr. Governor Willis about this. Waiting for Dr. Governor Willis to respond.

## 2021-04-02 NOTE — TELEPHONE ENCOUNTER
From: Eric Perla  To: Bobby Benitez MD  Sent: 4/2/2021 9:58 AM CDT  Subject: Visit Follow-up Question    Good Morning Dr. Diamond Benitez,    I hope you are having a great start to your day! I would like to thank you and your staff for everything yesterday!

## 2021-04-02 NOTE — TELEPHONE ENCOUNTER
Pt is unable to get an appt with Dr Blaise Wyman until 5/10    Also due to his allergies he cannot take Cipro rx - pls call

## 2021-04-02 NOTE — PROGRESS NOTES
Progress Note     Ame Singh    Is here complaining of right testicular pain he has had two episodes of gross hematuria he went to the ER CT scan of his abdomen and pelvis were negative no stone was seen he did have multiple cells in his urine of bl drainage  Eyes:  Negative for eye discharge and vision loss  Cardiovascular:  Negative for chest pain, sob  Respiratory:  Negative for cough, dyspnea and wheezing  Gastrointestinal:  Negative for abdominal pain, constipation  Genitourinary: Urine is now cl feels better no fever detected     Orders This Visit:  No orders of the defined types were placed in this encounter.       Meds This Visit:  Requested Prescriptions     Signed Prescriptions Disp Refills   • Ciprofloxacin HCl 500 MG Oral Tab 14 tablet 1

## 2021-04-03 RX ORDER — DOXYCYCLINE 100 MG/1
100 CAPSULE ORAL 2 TIMES DAILY
Qty: 20 CAPSULE | Refills: 1 | Status: SHIPPED | OUTPATIENT
Start: 2021-04-03 | End: 2021-04-13

## 2021-04-03 NOTE — TELEPHONE ENCOUNTER
Jarad hewitt   great running into you today   if we could get christian in w you this week, it would be greatly appreciated   thanks  Filiberto Lynne

## 2021-04-05 ENCOUNTER — TELEPHONE (OUTPATIENT)
Dept: SURGERY | Facility: CLINIC | Age: 36
End: 2021-04-05

## 2021-04-05 NOTE — TELEPHONE ENCOUNTER
Patient asking if Ashley Bean had reached out to our office to request appointment. Patient indicates he needs to be seen for Emidimitil? Patients call was lost before he could be offered appointment with APN. Please call at [phone].

## 2021-04-05 NOTE — TELEPHONE ENCOUNTER
Spoke with patient. Per [facility] message from 4/2 ok to offer 4/7/21 at 10am. With . PT confirmed and verbalized understanding.

## 2021-04-05 NOTE — TELEPHONE ENCOUNTER
Pt called to update call back number. Pt is scheduled for an appointment on 5-10-21. Pt requesting an appointment this week.   Please call pt

## 2021-04-05 NOTE — TELEPHONE ENCOUNTER
See TE from 4/5. Done. PT scheduled with DR. Yang on 4/7/21 at 10am. PT confirmed and verbalized understanding.      Future Appointments   Date Time Provider Domenic Gan   4/7/2021 10:00 AM Nataliya Cervantes MD [facility]

## 2021-04-07 ENCOUNTER — OFFICE VISIT (OUTPATIENT)
Dept: SURGERY | Facility: CLINIC | Age: 36
End: 2021-04-07
Payer: COMMERCIAL

## 2021-04-07 ENCOUNTER — TELEPHONE (OUTPATIENT)
Dept: SURGERY | Facility: CLINIC | Age: 36
End: 2021-04-07

## 2021-04-07 VITALS
HEART RATE: 82 BPM | DIASTOLIC BLOOD PRESSURE: 77 MMHG | WEIGHT: 201 LBS | BODY MASS INDEX: 27 KG/M2 | SYSTOLIC BLOOD PRESSURE: 128 MMHG

## 2021-04-07 DIAGNOSIS — R31.0 GROSS HEMATURIA: Primary | ICD-10-CM

## 2021-04-07 DIAGNOSIS — N50.811 RIGHT TESTICULAR PAIN: ICD-10-CM

## 2021-04-07 PROCEDURE — 3074F SYST BP LT 130 MM HG: CPT | Performed by: UROLOGY

## 2021-04-07 PROCEDURE — 3078F DIAST BP <80 MM HG: CPT | Performed by: UROLOGY

## 2021-04-07 PROCEDURE — 99204 OFFICE O/P NEW MOD 45 MIN: CPT | Performed by: UROLOGY

## 2021-04-07 NOTE — TELEPHONE ENCOUNTER
[address] [facility] to obtain prior authorization for pt's upcoming cystoscopy scheduled on 04/29/21 I spoke with Alina who submitted the prior authorization and provided me with the reference number [id number].  She informed me that someone would re

## 2021-04-07 NOTE — PROGRESS NOTES
[address] Urology  Initial Office Consultation    HPI:   Pennie Wray is a 39year old male here today for consultation at the request of, and a copy of this note will be sent to, Mingo Menjivar. Анна Sheppard MD.    1. Gross Hematuria  2.  Right Abdominal Pain [employer].      Family History   Problem Relation Age of Onset   • Thyroid Disorder Mother         hypothyroid   • Lipids Maternal Grandmother         hyperlipidemia   • Neurological Disorder Maternal Grandfather         Alzheimers   • Diabetes range of motion. Cervical back: Neck supple. Skin:     General: Skin is warm and dry. Neurological:      Mental Status: He is alert and oriented to person, place, and time.    Psychiatric:         Mood and Affect: Mood normal.         Behavior: Beh

## 2021-04-12 ENCOUNTER — TELEPHONE (OUTPATIENT)
Dept: SURGERY | Facility: CLINIC | Age: 36
End: 2021-04-12

## 2021-04-12 ENCOUNTER — TELEPHONE (OUTPATIENT)
Dept: NEPHROLOGY | Facility: CLINIC | Age: 36
End: 2021-04-12

## 2021-04-12 NOTE — TELEPHONE ENCOUNTER
Fax recd from [id number] [facility] Medical papers for pt    Emailed to LOIS- copy in folder -[facility]    See te today also

## 2021-04-12 NOTE — TELEPHONE ENCOUNTER
Disability forms received in forms department, logged for processing, [employer] attached with forms.

## 2021-04-13 ENCOUNTER — HOSPITAL ENCOUNTER (OUTPATIENT)
Dept: CT IMAGING | Facility: HOSPITAL | Age: 36
Discharge: HOME | End: 2021-04-13
Attending: UROLOGY
Payer: COMMERCIAL

## 2021-04-13 DIAGNOSIS — R31.0 GROSS HEMATURIA: ICD-10-CM

## 2021-04-13 PROCEDURE — 74178 CT ABD&PLV WO CNTR FLWD CNTR: CPT | Performed by: UROLOGY

## 2021-04-13 PROCEDURE — 76377 3D RENDER W/INTRP POSTPROCES: CPT | Performed by: UROLOGY

## 2021-04-13 NOTE — TELEPHONE ENCOUNTER
Jimena Degree,    I actually forget what I told him he is extremely are asked could you give him a call tomorrow and ask him when he started being off work and when I told him to return to work I think we left it open ended he should definitely be able to [facility]

## 2021-04-13 NOTE — TELEPHONE ENCOUNTER
Dr. Sb Mello, spoke with Beatrice Hall. Requesting information on pt's return to work. Couldn't find this information in the LOV 4/1/21. When was pt advised to stop working? What is his estimated return to work date?     Requesting information for disab

## 2021-04-14 ENCOUNTER — HOSPITAL ENCOUNTER (OUTPATIENT)
Dept: ULTRASOUND IMAGING | Facility: HOSPITAL | Age: 36
Discharge: HOME | End: 2021-04-14
Attending: UROLOGY
Payer: COMMERCIAL

## 2021-04-14 DIAGNOSIS — N50.811 RIGHT TESTICULAR PAIN: ICD-10-CM

## 2021-04-14 PROCEDURE — 76870 US EXAM SCROTUM: CPT | Performed by: UROLOGY

## 2021-04-14 PROCEDURE — 93975 VASCULAR STUDY: CPT | Performed by: UROLOGY

## 2021-04-14 NOTE — TELEPHONE ENCOUNTER
Spoke with patient. States he is doing better and not experiencing meghan blood in urine. States he has US testicles today with Dr. Gerardo Gandara and will let us know how it goes. Pt wants to know if he needs to schedule a F/U apt with Dr. Pete Watson?     Pt states he

## 2021-04-15 NOTE — TELEPHONE ENCOUNTER
Spoke with patient, sent letter to patient. Spoke with [facility] [employer] and relayed below information.

## 2021-04-16 NOTE — TELEPHONE ENCOUNTER
Returned patients call regarding details of disab. !st day off 3/26/21 and estimated RTW 5/3/21. Has procedure coming up.

## 2021-04-19 ENCOUNTER — MED REC SCAN ONLY (OUTPATIENT)
Dept: ADMINISTRATIVE | Age: 36
End: 2021-04-19

## 2021-04-19 NOTE — TELEPHONE ENCOUNTER
Dr. Jamar Calderón     Please sign off on form: Disability  -Highlight the patient and hit \"Chart\" button.   -In Chart Review, w/in the Encounter tab - click 1 time on the Telephone call encounter for 4/12/2021 Scroll down the telephone encounter.  -Click \"scan o

## 2021-04-22 ENCOUNTER — TELEPHONE (OUTPATIENT)
Dept: SURGERY | Facility: CLINIC | Age: 36
End: 2021-04-22

## 2021-04-29 ENCOUNTER — PROCEDURE (OUTPATIENT)
Dept: SURGERY | Facility: CLINIC | Age: 36
End: 2021-04-29
Payer: COMMERCIAL

## 2021-04-29 VITALS
HEIGHT: 72 IN | RESPIRATION RATE: 16 BRPM | HEART RATE: 66 BPM | WEIGHT: 201 LBS | SYSTOLIC BLOOD PRESSURE: 117 MMHG | DIASTOLIC BLOOD PRESSURE: 78 MMHG | BODY MASS INDEX: 27.22 KG/M2

## 2021-04-29 DIAGNOSIS — R31.0 GROSS HEMATURIA: Primary | ICD-10-CM

## 2021-04-29 DIAGNOSIS — N50.811 RIGHT TESTICULAR PAIN: ICD-10-CM

## 2021-04-29 PROCEDURE — 3078F DIAST BP <80 MM HG: CPT | Performed by: UROLOGY

## 2021-04-29 PROCEDURE — 99213 OFFICE O/P EST LOW 20 MIN: CPT | Performed by: UROLOGY

## 2021-04-29 PROCEDURE — 3074F SYST BP LT 130 MM HG: CPT | Performed by: UROLOGY

## 2021-04-29 PROCEDURE — 3008F BODY MASS INDEX DOCD: CPT | Performed by: UROLOGY

## 2021-04-29 PROCEDURE — 52000 CYSTOURETHROSCOPY: CPT | Performed by: UROLOGY

## 2021-04-29 RX ORDER — CEPHALEXIN 500 MG/1
500 CAPSULE ORAL ONCE
Qty: 1 CAPSULE | Refills: 0 | Status: SHIPPED | OUTPATIENT
Start: 2021-04-29 | End: 2021-04-29

## 2021-04-29 NOTE — TELEPHONE ENCOUNTER
Completed disability forms faxed to [facility], confirmation received. Sent pt a [employer] message.

## 2021-04-29 NOTE — PROGRESS NOTES
[facility] Urology  Follow-Up Visit    HPI: Augustin Tenorio is a 39year old male presents for a follow up visit. Patient was last seen on 4/7/2021. Accompanied by his mother.     INTERVAL HISTORY: Here for office cystoscopy to complete the evaluation of active and denies any penile discharge or pelvic pain. No history of nephrolithiasis or recurrent UTIs.    Currently he reports his pain is about similar. No further gross hematuria since 10 days ago.  His bladder scan today revealed a PVR of 0 mL.       or obstructive uropathy. 2. No suspicious lesions of the genitourinary tract are detected. 3. Minimal uncomplicated distal colonic diverticulosis. 4. Lesser incidental findings as above.         UROLOGY PROCEDURE:  CYSTOURETHROSCOPY  Informed consent was and chart review, face-to-face discussion involving counseling, further management and treatment planning, reviewing testing and interpreting imaging results, ordering new testing, and documenting in patient's medical record.     Shavonne Olivo MD  4/29/2

## 2021-05-06 ENCOUNTER — TELEPHONE (OUTPATIENT)
Dept: SURGERY | Facility: CLINIC | Age: 36
End: 2021-05-06

## 2021-05-06 NOTE — TELEPHONE ENCOUNTER
Received a notice from [facility] pharmacy(p:[phone]) regarding patient's prescribed keflex 500 mg 1 tab for 1 time dose on 4/29 -cystoscopy procedure. Asking for substitution due to PCN and sulfa allergies, state mother does not want to take any risks.      Will forward message to Timur Crane to advise

## 2021-05-26 VITALS
TEMPERATURE: 98.4 F | HEART RATE: 84 BPM | RESPIRATION RATE: 16 BRPM | OXYGEN SATURATION: 100 % | DIASTOLIC BLOOD PRESSURE: 86 MMHG | DIASTOLIC BLOOD PRESSURE: 83 MMHG | WEIGHT: 188 LBS | HEART RATE: 86 BPM | WEIGHT: 220.46 LBS | RESPIRATION RATE: 20 BRPM | OXYGEN SATURATION: 98 % | SYSTOLIC BLOOD PRESSURE: 126 MMHG | TEMPERATURE: 98.6 F | SYSTOLIC BLOOD PRESSURE: 139 MMHG

## 2021-07-07 ENCOUNTER — IMMUNIZATION (OUTPATIENT)
Dept: LAB | Facility: HOSPITAL | Age: 36
End: 2021-07-07
Attending: EMERGENCY MEDICINE
Payer: OTHER GOVERNMENT

## 2021-07-07 DIAGNOSIS — Z23 NEED FOR VACCINATION: Primary | ICD-10-CM

## 2021-07-07 PROCEDURE — 0001A SARSCOV2 VAC 30MCG/0.3ML IM: CPT

## 2021-07-28 ENCOUNTER — IMMUNIZATION (OUTPATIENT)
Dept: LAB | Facility: HOSPITAL | Age: 36
End: 2021-07-28
Attending: EMERGENCY MEDICINE
Payer: OTHER GOVERNMENT

## 2021-07-28 DIAGNOSIS — Z23 NEED FOR VACCINATION: Primary | ICD-10-CM

## 2021-07-28 PROCEDURE — 0002A SARSCOV2 VAC 30MCG/0.3ML IM: CPT

## 2021-08-18 ENCOUNTER — PATIENT MESSAGE (OUTPATIENT)
Dept: NEPHROLOGY | Facility: CLINIC | Age: 36
End: 2021-08-18

## 2021-09-10 NOTE — TELEPHONE ENCOUNTER
Please see below. Pt provided condition update. Offered pt avail spot on 9/29 at 2:40pm if appropriate. Thank you!

## 2021-10-20 ENCOUNTER — LAB ENCOUNTER (OUTPATIENT)
Dept: LAB | Facility: HOSPITAL | Age: 36
End: 2021-10-20
Attending: NURSE PRACTITIONER
Payer: COMMERCIAL

## 2021-10-20 DIAGNOSIS — R56.9 GENERALIZED-ONSET SEIZURES (HCC): Primary | ICD-10-CM

## 2021-10-20 PROCEDURE — 80053 COMPREHEN METABOLIC PANEL: CPT

## 2021-10-20 PROCEDURE — 36415 COLL VENOUS BLD VENIPUNCTURE: CPT

## 2021-10-20 PROCEDURE — 85025 COMPLETE CBC W/AUTO DIFF WBC: CPT

## 2021-10-20 PROCEDURE — 80183 DRUG SCRN QUANT OXCARBAZEPIN: CPT

## 2021-12-02 ENCOUNTER — TELEPHONE (OUTPATIENT)
Dept: NEPHROLOGY | Facility: CLINIC | Age: 36
End: 2021-12-02

## 2021-12-02 DIAGNOSIS — Z20.822 SUSPECTED COVID-19 VIRUS INFECTION: Primary | ICD-10-CM

## 2021-12-02 NOTE — TELEPHONE ENCOUNTER
Please see below message. Spoke with pt, states he only has symptom of runny nose since last night. Denies sob, fever, chest pain, fever, body aches, chills. Pt states he has been taking vitamins, gatorade, vitamin C supplements.  Requesting if he can ha

## 2021-12-02 NOTE — TELEPHONE ENCOUNTER
Pt. States that he went to a Thanksgiving party with about 13 guests which they were all vaccinatied, but 9 people were tested postive for covid-19 and now his brother in law is postive with covid-19 as of today. Pt. States that that only symptom pt.  Has i

## 2021-12-03 ENCOUNTER — NURSE ONLY (OUTPATIENT)
Dept: LAB | Facility: HOSPITAL | Age: 36
End: 2021-12-03
Attending: INTERNAL MEDICINE
Payer: COMMERCIAL

## 2021-12-03 DIAGNOSIS — Z20.822 SUSPECTED COVID-19 VIRUS INFECTION: ICD-10-CM

## 2021-12-20 ENCOUNTER — OFFICE VISIT (OUTPATIENT)
Dept: NEPHROLOGY | Facility: CLINIC | Age: 36
End: 2021-12-20
Payer: COMMERCIAL

## 2021-12-20 VITALS
BODY MASS INDEX: 26.01 KG/M2 | HEART RATE: 75 BPM | WEIGHT: 192 LBS | SYSTOLIC BLOOD PRESSURE: 122 MMHG | HEIGHT: 72 IN | DIASTOLIC BLOOD PRESSURE: 83 MMHG

## 2021-12-20 DIAGNOSIS — G40.909 SEIZURE DISORDER (HCC): ICD-10-CM

## 2021-12-20 DIAGNOSIS — D68.59 HYPERCOAGULABLE STATE (HCC): Primary | ICD-10-CM

## 2021-12-20 PROCEDURE — 3074F SYST BP LT 130 MM HG: CPT | Performed by: INTERNAL MEDICINE

## 2021-12-20 PROCEDURE — 3079F DIAST BP 80-89 MM HG: CPT | Performed by: INTERNAL MEDICINE

## 2021-12-20 PROCEDURE — 3008F BODY MASS INDEX DOCD: CPT | Performed by: INTERNAL MEDICINE

## 2021-12-20 PROCEDURE — 90471 IMMUNIZATION ADMIN: CPT | Performed by: INTERNAL MEDICINE

## 2021-12-20 PROCEDURE — 90686 IIV4 VACC NO PRSV 0.5 ML IM: CPT | Performed by: INTERNAL MEDICINE

## 2021-12-20 PROCEDURE — 99214 OFFICE O/P EST MOD 30 MIN: CPT | Performed by: INTERNAL MEDICINE

## 2021-12-20 RX ORDER — ACETAMINOPHEN 325 MG/1
325 TABLET ORAL EVERY 6 HOURS PRN
COMMUNITY

## 2021-12-20 NOTE — PATIENT INSTRUCTIONS
Good job with your health christian    Get factor V Leiden test no need to fast      See me back in June for a yearly physical    Call me in April to get blood work done    Have a great holiday season

## 2021-12-24 NOTE — PROGRESS NOTES
Progress Note     Eric Perla    Is here is doing well his father passed away from a pulmonary embolus recently with did talk about that his mood is good he did have a recent grand mal seizure he does follow-up with a neurologist.  He is stable and c and hematuria  Endocrine:  Negative for abnormal sleep patterns  Hema/Lymph:  Negative for easy bleeding and easy bruising  Integumentary:  Negative for pruritus and rash  Musculoskeletal:  Negative for bone/joint symptoms  Neurological:  Negative for gait

## 2021-12-27 ENCOUNTER — LAB ENCOUNTER (OUTPATIENT)
Dept: LAB | Facility: HOSPITAL | Age: 36
End: 2021-12-27
Attending: INTERNAL MEDICINE
Payer: COMMERCIAL

## 2021-12-27 PROCEDURE — 36415 COLL VENOUS BLD VENIPUNCTURE: CPT | Performed by: INTERNAL MEDICINE

## 2021-12-27 PROCEDURE — 81241 F5 GENE: CPT | Performed by: INTERNAL MEDICINE

## 2021-12-29 ENCOUNTER — TELEPHONE (OUTPATIENT)
Dept: NEPHROLOGY | Facility: CLINIC | Age: 36
End: 2021-12-29

## 2021-12-29 ENCOUNTER — PATIENT MESSAGE (OUTPATIENT)
Dept: NEPHROLOGY | Facility: CLINIC | Age: 36
End: 2021-12-29

## 2021-12-29 DIAGNOSIS — Z20.822 CLOSE EXPOSURE TO COVID-19 VIRUS: Primary | ICD-10-CM

## 2021-12-29 NOTE — TELEPHONE ENCOUNTER
Patient is requesting a PCR test for his new job. Can you confirm if you are keeping him? He is on the list but your last office visit note it states to return in June.

## 2021-12-29 NOTE — TELEPHONE ENCOUNTER
Patient states he is starting a new job on Monday, 1/3/2022 and before he can start it he needs to have a PCR test.  Requesting orders. Please call - ok to leave detailed message. Thank you.

## 2021-12-29 NOTE — TELEPHONE ENCOUNTER
From: Cheryl Mac  To: Matt Zimmerman MD  Sent: 12/29/2021 3:55 PM CST  Subject: Covid 19 PCR TEST by Friday to start job on Jan 3rd. Dr. Paco Zimmerman,    Thank you for seeing me last week!  I hope you and everyone in your office had a great [place] and

## 2021-12-30 NOTE — TELEPHONE ENCOUNTER
From: Calvin Phillips  Sent: 12/29/2021 5:35 PM CST  To: Em [facility] Clinical Staff  Subject: Covid 19 PCR TEST by Friday to start job on Jan 3rd. Irma Yanez you for sending the order out! I hope you have a great holiday weekend!  Quick question if I wa

## 2022-01-21 ENCOUNTER — IMMUNIZATION (OUTPATIENT)
Dept: LAB | Facility: HOSPITAL | Age: 37
End: 2022-01-21
Attending: EMERGENCY MEDICINE
Payer: COMMERCIAL

## 2022-01-21 DIAGNOSIS — Z23 NEED FOR VACCINATION: Primary | ICD-10-CM

## 2022-01-21 PROCEDURE — 0004A SARSCOV2 VAC 30MCG/0.3ML IM: CPT

## 2022-02-18 ENCOUNTER — HOSPITAL ENCOUNTER (OUTPATIENT)
Dept: GENERAL RADIOLOGY | Age: 37
Discharge: HOME | End: 2022-02-18
Attending: EMERGENCY MEDICINE

## 2022-02-18 ENCOUNTER — WALK IN (OUTPATIENT)
Dept: URGENT CARE | Age: 37
End: 2022-02-18
Attending: EMERGENCY MEDICINE

## 2022-02-18 VITALS
SYSTOLIC BLOOD PRESSURE: 119 MMHG | TEMPERATURE: 98.1 F | HEART RATE: 80 BPM | OXYGEN SATURATION: 97 % | WEIGHT: 190 LBS | RESPIRATION RATE: 16 BRPM | DIASTOLIC BLOOD PRESSURE: 86 MMHG | BODY MASS INDEX: 24.39 KG/M2

## 2022-02-18 DIAGNOSIS — Z87.898 HISTORY OF WHEEZING: ICD-10-CM

## 2022-02-18 DIAGNOSIS — J06.9 UPPER RESPIRATORY TRACT INFECTION, UNSPECIFIED TYPE: Primary | ICD-10-CM

## 2022-02-18 DIAGNOSIS — R05.9 COUGH: ICD-10-CM

## 2022-02-18 PROCEDURE — 99212 OFFICE O/P EST SF 10 MIN: CPT

## 2022-02-18 PROCEDURE — C9803 HOPD COVID-19 SPEC COLLECT: HCPCS

## 2022-02-18 PROCEDURE — U0003 INFECTIOUS AGENT DETECTION BY NUCLEIC ACID (DNA OR RNA); SEVERE ACUTE RESPIRATORY SYNDROME CORONAVIRUS 2 (SARS-COV-2) (CORONAVIRUS DISEASE [COVID-19]), AMPLIFIED PROBE TECHNIQUE, MAKING USE OF HIGH THROUGHPUT TECHNOLOGIES AS DESCRIBED BY CMS-2020-01-R: HCPCS | Performed by: EMERGENCY MEDICINE

## 2022-02-18 RX ORDER — DOXYCYCLINE HYCLATE 100 MG
100 TABLET ORAL 2 TIMES DAILY
Qty: 14 TABLET | Refills: 0 | Status: SHIPPED | OUTPATIENT
Start: 2022-02-18 | End: 2022-02-25

## 2022-02-18 RX ORDER — ALBUTEROL SULFATE 90 UG/1
2 AEROSOL, METERED RESPIRATORY (INHALATION) EVERY 4 HOURS PRN
Qty: 1 EACH | Refills: 0 | Status: SHIPPED | OUTPATIENT
Start: 2022-02-18 | End: 2022-03-04

## 2022-02-18 ASSESSMENT — PAIN SCALES - GENERAL
PAINLEVEL_OUTOF10: 0
PAINLEVEL: 0

## 2022-02-19 ENCOUNTER — TELEPHONE (OUTPATIENT)
Dept: URGENT CARE | Age: 37
End: 2022-02-19

## 2022-02-19 LAB
SARS-COV-2 RNA RESP QL NAA+PROBE: NOT DETECTED
SERVICE CMNT-IMP: NORMAL
SERVICE CMNT-IMP: NORMAL

## 2022-04-21 ENCOUNTER — TELEPHONE (OUTPATIENT)
Dept: NEPHROLOGY | Facility: CLINIC | Age: 37
End: 2022-04-21

## 2022-04-21 NOTE — TELEPHONE ENCOUNTER
Spoke to patient and relayed Dr. Michael Aranda message as shown below. Patient verbalized understanding and had no further questions at this time.       Saba Story MD  You 39 minutes ago (12:32 PM)     Not necessary    Message text

## 2022-04-21 NOTE — TELEPHONE ENCOUNTER
Dr. Charis Schaefer, patient wondering if you would like to see him after quarantine is over as he tested positive for covid on 4/19/22 with an at home test and PCR positive result on 4/20/22. Thank you.

## 2022-04-21 NOTE — TELEPHONE ENCOUNTER
Patient was calling to inform provider that he has tested positive for Covid 4/20/22. Patient did at home test on Tuesday and went for a PCR test yesterday and both test were positive. Patient would like to know if physician would like to see patient after quarantine is over. Please call.

## 2022-05-04 ENCOUNTER — WALK IN (OUTPATIENT)
Dept: URGENT CARE | Age: 37
End: 2022-05-04
Attending: EMERGENCY MEDICINE

## 2022-05-04 ENCOUNTER — TELEPHONE (OUTPATIENT)
Dept: NEPHROLOGY | Facility: CLINIC | Age: 37
End: 2022-05-04

## 2022-05-04 VITALS
SYSTOLIC BLOOD PRESSURE: 128 MMHG | DIASTOLIC BLOOD PRESSURE: 74 MMHG | WEIGHT: 195 LBS | BODY MASS INDEX: 25.04 KG/M2 | OXYGEN SATURATION: 96 % | HEART RATE: 86 BPM | RESPIRATION RATE: 16 BRPM | TEMPERATURE: 97.6 F

## 2022-05-04 DIAGNOSIS — R19.7 DIARRHEA, UNSPECIFIED TYPE: Primary | ICD-10-CM

## 2022-05-04 PROCEDURE — 99212 OFFICE O/P EST SF 10 MIN: CPT

## 2022-05-04 RX ORDER — DICYCLOMINE HCL 20 MG
10 TABLET ORAL 4 TIMES DAILY PRN
Qty: 14 TABLET | Refills: 0 | Status: SHIPPED | OUTPATIENT
Start: 2022-05-04 | End: 2022-05-11

## 2022-05-04 RX ORDER — OXCARBAZEPINE 300 MG
1500 TABLET ORAL DAILY
COMMUNITY
Start: 2022-04-18

## 2022-05-04 RX ORDER — ACETAMINOPHEN 325 MG/1
325 TABLET ORAL
COMMUNITY

## 2022-05-04 ASSESSMENT — ENCOUNTER SYMPTOMS
WEAKNESS: 0
DIARRHEA: 1
CONFUSION: 0
DIZZINESS: 0
WHEEZING: 0
COUGH: 0
FEVER: 0
VOMITING: 0
HEADACHES: 1
EYE PAIN: 0
ABDOMINAL DISTENTION: 1
NUMBNESS: 0
FATIGUE: 1
HALLUCINATIONS: 0
CHILLS: 0
SORE THROAT: 0
ABDOMINAL PAIN: 0
NAUSEA: 0
SHORTNESS OF BREATH: 0

## 2022-05-04 ASSESSMENT — PAIN SCALES - GENERAL
PAINLEVEL_OUTOF10: 7
PAINLEVEL: 7

## 2022-05-04 NOTE — TELEPHONE ENCOUNTER
Tested positive for Covid on April 20th, Diarrhea started 5-6 days after testing positive. Reports having yellow watery diarrhea throughout the day reports about 3-4 watery episodes with some abdominal pain/cramping felt by the belly button x 1 weeks. Rates pain 6/10 at this time. Reports also had a low grade fever yesterday at 99.5F. Was seen in urgent care at [facility] today and was provided with Dicyclomine 20 mg half a table QID as needed for cramping. Reports cramping has improved with the abd pain. Reports taking at home test which was negative. Patient requesting advise on what medication he may take to help with diarrhea that would be safe for him. Thank you.

## 2022-05-04 NOTE — TELEPHONE ENCOUNTER
Spoke to patient and relayed Dr. González Chu recommendations. Patient verbalized understanding and had no further questions at this time.

## 2022-05-04 NOTE — TELEPHONE ENCOUNTER
Patient states he still has after-effects of COVID. Patient states he did test negative for COVID yesterday. Please call. Thank you.

## 2022-05-16 ENCOUNTER — PATIENT MESSAGE (OUTPATIENT)
Dept: NEPHROLOGY | Facility: CLINIC | Age: 37
End: 2022-05-16

## 2022-05-18 ENCOUNTER — LAB ENCOUNTER (OUTPATIENT)
Dept: LAB | Facility: HOSPITAL | Age: 37
End: 2022-05-18
Attending: INTERNAL MEDICINE
Payer: COMMERCIAL

## 2022-05-18 DIAGNOSIS — R19.7 DIARRHEA OF PRESUMED INFECTIOUS ORIGIN: ICD-10-CM

## 2022-05-18 LAB
CRYPTOSP AG STL QL IA: NEGATIVE
G LAMBLIA AG STL QL IA: NEGATIVE

## 2022-05-18 PROCEDURE — 87493 C DIFF AMPLIFIED PROBE: CPT

## 2022-05-18 PROCEDURE — 87272 CRYPTOSPORIDIUM AG IF: CPT

## 2022-05-18 PROCEDURE — 87329 GIARDIA AG IA: CPT

## 2022-05-19 ENCOUNTER — TELEPHONE (OUTPATIENT)
Dept: NEPHROLOGY | Facility: CLINIC | Age: 37
End: 2022-05-19

## 2022-05-19 LAB — C DIFF TOX B STL QL: POSITIVE

## 2022-05-19 RX ORDER — VANCOMYCIN HYDROCHLORIDE 125 MG/1
125 CAPSULE ORAL 4 TIMES DAILY
Qty: 40 CAPSULE | Refills: 1 | Status: SHIPPED | OUTPATIENT
Start: 2022-05-19 | End: 2022-05-29

## 2022-08-12 NOTE — TELEPHONE ENCOUNTER
Order placed. My chart message sent to patient. Humira Counseling:  I discussed with the patient the risks of adalimumab including but not limited to myelosuppression, immunosuppression, autoimmune hepatitis, demyelinating diseases, lymphoma, and serious infections.  The patient understands that monitoring is required including a PPD at baseline and must alert us or the primary physician if symptoms of infection or other concerning signs are noted.

## 2023-02-08 ENCOUNTER — OFFICE VISIT (OUTPATIENT)
Dept: NEPHROLOGY | Facility: CLINIC | Age: 38
End: 2023-02-08

## 2023-02-08 VITALS
HEIGHT: 72 IN | WEIGHT: 197 LBS | DIASTOLIC BLOOD PRESSURE: 76 MMHG | HEART RATE: 63 BPM | SYSTOLIC BLOOD PRESSURE: 125 MMHG | BODY MASS INDEX: 26.68 KG/M2

## 2023-02-08 DIAGNOSIS — Z00.00 HEALTH MAINTENANCE EXAMINATION: ICD-10-CM

## 2023-02-08 DIAGNOSIS — G40.909 SEIZURE DISORDER (HCC): Primary | ICD-10-CM

## 2023-02-08 PROCEDURE — 99214 OFFICE O/P EST MOD 30 MIN: CPT | Performed by: INTERNAL MEDICINE

## 2023-02-08 PROCEDURE — 3008F BODY MASS INDEX DOCD: CPT | Performed by: INTERNAL MEDICINE

## 2023-02-08 PROCEDURE — 3078F DIAST BP <80 MM HG: CPT | Performed by: INTERNAL MEDICINE

## 2023-02-08 PROCEDURE — 3074F SYST BP LT 130 MM HG: CPT | Performed by: INTERNAL MEDICINE

## 2023-02-08 RX ORDER — SILDENAFIL 100 MG/1
100 TABLET, FILM COATED ORAL
Qty: 90 TABLET | Refills: 1 | Status: SHIPPED | OUTPATIENT
Start: 2023-02-08

## 2023-02-08 NOTE — PATIENT INSTRUCTIONS
Good job with your health Silver    Please get labs done they should be fasting please see me back in 1 year    Make sure you stay up-to-date with your COVID vaccines please    Regards to your mom

## 2023-02-09 ENCOUNTER — LAB ENCOUNTER (OUTPATIENT)
Dept: LAB | Facility: HOSPITAL | Age: 38
End: 2023-02-09
Attending: INTERNAL MEDICINE
Payer: COMMERCIAL

## 2023-02-09 LAB
ALBUMIN SERPL-MCNC: 4.1 G/DL (ref 3.4–5)
ALBUMIN/GLOB SERPL: 1.1 {RATIO} (ref 1–2)
ALP LIVER SERPL-CCNC: 94 U/L
ALT SERPL-CCNC: 17 U/L
ANION GAP SERPL CALC-SCNC: 5 MMOL/L (ref 0–18)
AST SERPL-CCNC: 20 U/L (ref 15–37)
BASOPHILS # BLD AUTO: 0.06 X10(3) UL (ref 0–0.2)
BASOPHILS NFR BLD AUTO: 1 %
BILIRUB SERPL-MCNC: 0.4 MG/DL (ref 0.1–2)
BUN BLD-MCNC: 12 MG/DL (ref 7–18)
BUN/CREAT SERPL: 13.8 (ref 10–20)
CALCIUM BLD-MCNC: 9.4 MG/DL (ref 8.5–10.1)
CHLORIDE SERPL-SCNC: 99 MMOL/L (ref 98–112)
CHOLEST SERPL-MCNC: 167 MG/DL (ref ?–200)
CO2 SERPL-SCNC: 31 MMOL/L (ref 21–32)
CREAT BLD-MCNC: 0.87 MG/DL
DEPRECATED RDW RBC AUTO: 38.7 FL (ref 35.1–46.3)
EOSINOPHIL # BLD AUTO: 0.23 X10(3) UL (ref 0–0.7)
EOSINOPHIL NFR BLD AUTO: 3.9 %
ERYTHROCYTE [DISTWIDTH] IN BLOOD BY AUTOMATED COUNT: 11.8 % (ref 11–15)
FASTING PATIENT LIPID ANSWER: YES
FASTING STATUS PATIENT QL REPORTED: YES
GFR SERPLBLD BASED ON 1.73 SQ M-ARVRAT: 113 ML/MIN/1.73M2 (ref 60–?)
GLOBULIN PLAS-MCNC: 3.7 G/DL (ref 2.8–4.4)
GLUCOSE BLD-MCNC: 85 MG/DL (ref 70–99)
HCT VFR BLD AUTO: 43.1 %
HDLC SERPL-MCNC: 74 MG/DL (ref 40–59)
HGB BLD-MCNC: 14.8 G/DL
IMM GRANULOCYTES # BLD AUTO: 0.01 X10(3) UL (ref 0–1)
IMM GRANULOCYTES NFR BLD: 0.2 %
LDLC SERPL CALC-MCNC: 78 MG/DL (ref ?–100)
LYMPHOCYTES # BLD AUTO: 1.82 X10(3) UL (ref 1–4)
LYMPHOCYTES NFR BLD AUTO: 30.5 %
MCH RBC QN AUTO: 30.8 PG (ref 26–34)
MCHC RBC AUTO-ENTMCNC: 34.3 G/DL (ref 31–37)
MCV RBC AUTO: 89.8 FL
MONOCYTES # BLD AUTO: 0.65 X10(3) UL (ref 0.1–1)
MONOCYTES NFR BLD AUTO: 10.9 %
NEUTROPHILS # BLD AUTO: 3.19 X10 (3) UL (ref 1.5–7.7)
NEUTROPHILS # BLD AUTO: 3.19 X10(3) UL (ref 1.5–7.7)
NEUTROPHILS NFR BLD AUTO: 53.5 %
NONHDLC SERPL-MCNC: 93 MG/DL (ref ?–130)
OSMOLALITY SERPL CALC.SUM OF ELEC: 279 MOSM/KG (ref 275–295)
PLATELET # BLD AUTO: 171 10(3)UL (ref 150–450)
POTASSIUM SERPL-SCNC: 4.2 MMOL/L (ref 3.5–5.1)
PROT SERPL-MCNC: 7.8 G/DL (ref 6.4–8.2)
RBC # BLD AUTO: 4.8 X10(6)UL
SODIUM SERPL-SCNC: 135 MMOL/L (ref 136–145)
TRIGL SERPL-MCNC: 81 MG/DL (ref 30–149)
VLDLC SERPL CALC-MCNC: 13 MG/DL (ref 0–30)
WBC # BLD AUTO: 6 X10(3) UL (ref 4–11)

## 2023-02-09 PROCEDURE — 80053 COMPREHEN METABOLIC PANEL: CPT | Performed by: INTERNAL MEDICINE

## 2023-02-09 PROCEDURE — 80061 LIPID PANEL: CPT | Performed by: INTERNAL MEDICINE

## 2023-02-09 PROCEDURE — 85025 COMPLETE CBC W/AUTO DIFF WBC: CPT | Performed by: INTERNAL MEDICINE

## 2023-02-09 PROCEDURE — 36415 COLL VENOUS BLD VENIPUNCTURE: CPT | Performed by: INTERNAL MEDICINE

## 2023-04-05 ENCOUNTER — OFFICE VISIT (OUTPATIENT)
Dept: FAMILY MEDICINE CLINIC | Facility: CLINIC | Age: 38
End: 2023-04-05
Payer: COMMERCIAL

## 2023-04-05 VITALS
HEART RATE: 65 BPM | BODY MASS INDEX: 26.41 KG/M2 | RESPIRATION RATE: 16 BRPM | OXYGEN SATURATION: 100 % | DIASTOLIC BLOOD PRESSURE: 84 MMHG | WEIGHT: 195 LBS | SYSTOLIC BLOOD PRESSURE: 124 MMHG | TEMPERATURE: 97 F | HEIGHT: 72 IN

## 2023-04-05 DIAGNOSIS — M70.21 OLECRANON BURSITIS OF RIGHT ELBOW: Primary | ICD-10-CM

## 2023-04-05 PROCEDURE — 3079F DIAST BP 80-89 MM HG: CPT | Performed by: PHYSICIAN ASSISTANT

## 2023-04-05 PROCEDURE — 99203 OFFICE O/P NEW LOW 30 MIN: CPT | Performed by: PHYSICIAN ASSISTANT

## 2023-04-05 PROCEDURE — 3008F BODY MASS INDEX DOCD: CPT | Performed by: PHYSICIAN ASSISTANT

## 2023-04-05 PROCEDURE — 3074F SYST BP LT 130 MM HG: CPT | Performed by: PHYSICIAN ASSISTANT

## 2023-04-06 NOTE — PATIENT INSTRUCTIONS
Rest - no leaning on the elbow   Ibuprofen OTC for pain/swelling   Ok to wear elbow brace for protection   Monitor for redness/warmth/fever - recheck if occurs   Close follow up with PCP/ortho if persists or worsens

## 2023-04-16 ENCOUNTER — HOSPITAL ENCOUNTER (EMERGENCY)
Age: 38
Discharge: HOME | End: 2023-04-16
Attending: STUDENT IN AN ORGANIZED HEALTH CARE EDUCATION/TRAINING PROGRAM

## 2023-04-16 ENCOUNTER — APPOINTMENT (OUTPATIENT)
Dept: CT IMAGING | Age: 38
End: 2023-04-16
Attending: STUDENT IN AN ORGANIZED HEALTH CARE EDUCATION/TRAINING PROGRAM

## 2023-04-16 VITALS
DIASTOLIC BLOOD PRESSURE: 94 MMHG | HEIGHT: 73 IN | BODY MASS INDEX: 25.45 KG/M2 | RESPIRATION RATE: 14 BRPM | HEART RATE: 62 BPM | OXYGEN SATURATION: 95 % | WEIGHT: 192 LBS | SYSTOLIC BLOOD PRESSURE: 129 MMHG | TEMPERATURE: 98.2 F

## 2023-04-16 DIAGNOSIS — Z13.9 ENCOUNTER FOR MEDICAL SCREENING EXAMINATION: Primary | ICD-10-CM

## 2023-04-16 DIAGNOSIS — R10.9 ABDOMINAL PAIN, UNSPECIFIED ABDOMINAL LOCATION: ICD-10-CM

## 2023-04-16 LAB
ALBUMIN SERPL-MCNC: 3.4 G/DL (ref 3.6–5.1)
ALBUMIN/GLOB SERPL: 0.9 {RATIO} (ref 1–2.4)
ALP SERPL-CCNC: 82 UNITS/L (ref 45–117)
ALT SERPL-CCNC: 11 UNITS/L
ANION GAP SERPL CALC-SCNC: 7 MMOL/L (ref 7–19)
AST SERPL-CCNC: 12 UNITS/L
BASOPHILS # BLD: 0 K/MCL (ref 0–0.3)
BASOPHILS NFR BLD: 1 %
BILIRUB SERPL-MCNC: 0.3 MG/DL (ref 0.2–1)
BUN SERPL-MCNC: 10 MG/DL (ref 6–20)
BUN/CREAT SERPL: 13 (ref 7–25)
CALCIUM SERPL-MCNC: 8.5 MG/DL (ref 8.4–10.2)
CHLORIDE SERPL-SCNC: 105 MMOL/L (ref 97–110)
CK SERPL-CCNC: 127 UNITS/L (ref 39–308)
CO2 SERPL-SCNC: 29 MMOL/L (ref 21–32)
CREAT SERPL-MCNC: 0.79 MG/DL (ref 0.67–1.17)
DEPRECATED RDW RBC: 39.8 FL (ref 39–50)
EOSINOPHIL # BLD: 0.2 K/MCL (ref 0–0.5)
EOSINOPHIL NFR BLD: 4 %
ERYTHROCYTE [DISTWIDTH] IN BLOOD: 11.7 % (ref 11–15)
FASTING DURATION TIME PATIENT: ABNORMAL H
GFR SERPLBLD BASED ON 1.73 SQ M-ARVRAT: >90 ML/MIN
GLOBULIN SER-MCNC: 3.6 G/DL (ref 2–4)
GLUCOSE SERPL-MCNC: 99 MG/DL (ref 70–99)
HCT VFR BLD CALC: 40.2 % (ref 39–51)
HGB BLD-MCNC: 13.5 G/DL (ref 13–17)
IMM GRANULOCYTES # BLD AUTO: 0 K/MCL (ref 0–0.2)
IMM GRANULOCYTES # BLD: 0 %
LIPASE SERPL-CCNC: 134 UNITS/L (ref 73–393)
LYMPHOCYTES # BLD: 1.8 K/MCL (ref 1–4.8)
LYMPHOCYTES NFR BLD: 34 %
MCH RBC QN AUTO: 31 PG (ref 26–34)
MCHC RBC AUTO-ENTMCNC: 33.6 G/DL (ref 32–36.5)
MCV RBC AUTO: 92.2 FL (ref 78–100)
MONOCYTES # BLD: 0.6 K/MCL (ref 0.3–0.9)
MONOCYTES NFR BLD: 12 %
NEUTROPHILS # BLD: 2.5 K/MCL (ref 1.8–7.7)
NEUTROPHILS NFR BLD: 49 %
NRBC BLD MANUAL-RTO: 0 /100 WBC
PLATELET # BLD AUTO: 171 K/MCL (ref 140–450)
POTASSIUM SERPL-SCNC: 3.7 MMOL/L (ref 3.4–5.1)
PROT SERPL-MCNC: 7 G/DL (ref 6.4–8.2)
RBC # BLD: 4.36 MIL/MCL (ref 4.5–5.9)
SODIUM SERPL-SCNC: 137 MMOL/L (ref 135–145)
WBC # BLD: 5.1 K/MCL (ref 4.2–11)

## 2023-04-16 PROCEDURE — 74177 CT ABD & PELVIS W/CONTRAST: CPT

## 2023-04-16 PROCEDURE — 80053 COMPREHEN METABOLIC PANEL: CPT | Performed by: STUDENT IN AN ORGANIZED HEALTH CARE EDUCATION/TRAINING PROGRAM

## 2023-04-16 PROCEDURE — 10002805 HB CONTRAST AGENT: Performed by: STUDENT IN AN ORGANIZED HEALTH CARE EDUCATION/TRAINING PROGRAM

## 2023-04-16 PROCEDURE — 93005 ELECTROCARDIOGRAM TRACING: CPT | Performed by: STUDENT IN AN ORGANIZED HEALTH CARE EDUCATION/TRAINING PROGRAM

## 2023-04-16 PROCEDURE — 82550 ASSAY OF CK (CPK): CPT | Performed by: STUDENT IN AN ORGANIZED HEALTH CARE EDUCATION/TRAINING PROGRAM

## 2023-04-16 PROCEDURE — 83690 ASSAY OF LIPASE: CPT | Performed by: STUDENT IN AN ORGANIZED HEALTH CARE EDUCATION/TRAINING PROGRAM

## 2023-04-16 PROCEDURE — G1004 CDSM NDSC: HCPCS

## 2023-04-16 PROCEDURE — 85025 COMPLETE CBC W/AUTO DIFF WBC: CPT | Performed by: STUDENT IN AN ORGANIZED HEALTH CARE EDUCATION/TRAINING PROGRAM

## 2023-04-16 PROCEDURE — 99284 EMERGENCY DEPT VISIT MOD MDM: CPT

## 2023-04-16 PROCEDURE — 36415 COLL VENOUS BLD VENIPUNCTURE: CPT

## 2023-04-16 RX ADMIN — IOHEXOL 80 ML: 350 INJECTION, SOLUTION INTRAVENOUS at 21:32

## 2023-04-16 ASSESSMENT — MOVEMENT AND STRENGTH ASSESSMENTS
FACE_JAW: FACE SYMMETRICAL
ALL_EXTREMITIES: EQUAL STRENGTH/TONE/MOVEMENT

## 2023-04-16 ASSESSMENT — PAIN SCALES - GENERAL: PAINLEVEL_OUTOF10: 7

## 2023-04-17 ENCOUNTER — TELEPHONE (OUTPATIENT)
Dept: NEPHROLOGY | Facility: CLINIC | Age: 38
End: 2023-04-17

## 2023-04-17 LAB
ATRIAL RATE (BPM): 54
P AXIS (DEGREES): 46
PR-INTERVAL (MSEC): 164
QRS-INTERVAL (MSEC): 94
QT-INTERVAL (MSEC): 424
QTC: 402
R AXIS (DEGREES): 48
RAINBOW EXTRA TUBES HOLD SPECIMEN: NORMAL
REPORT TEXT: NORMAL
T AXIS (DEGREES): 32
VENTRICULAR RATE EKG/MIN (BPM): 54

## 2023-04-17 NOTE — TELEPHONE ENCOUNTER
Patient calling to inform Dr Zena Flynn that he was at ER at [facility] yesterday for seizure like activity - had EKG and CT Scan done. Please call. Thank you.

## 2023-04-17 NOTE — TELEPHONE ENCOUNTER
Pt is following up on this. States he needs an appt within the week if possible.  The schedule is booked out please follow up

## 2023-04-19 ENCOUNTER — TELEPHONE (OUTPATIENT)
Dept: NEPHROLOGY | Facility: CLINIC | Age: 38
End: 2023-04-19

## 2023-04-19 NOTE — TELEPHONE ENCOUNTER
Pearl Ruiz MD  You 15 hours ago (4:29 PM)     Add Friday 240. Needs to bring a book. Please notify he will have a long wait.

## 2023-04-20 ENCOUNTER — LAB ENCOUNTER (OUTPATIENT)
Dept: LAB | Facility: HOSPITAL | Age: 38
End: 2023-04-20
Payer: COMMERCIAL

## 2023-04-20 DIAGNOSIS — G40.109 KOJEVNIKOV'S EPILEPSY (HCC): Primary | ICD-10-CM

## 2023-04-20 PROCEDURE — 36415 COLL VENOUS BLD VENIPUNCTURE: CPT

## 2023-04-20 PROCEDURE — 80183 DRUG SCRN QUANT OXCARBAZEPIN: CPT

## 2023-04-21 ENCOUNTER — OFFICE VISIT (OUTPATIENT)
Dept: NEPHROLOGY | Facility: CLINIC | Age: 38
End: 2023-04-21

## 2023-04-21 VITALS
HEART RATE: 64 BPM | DIASTOLIC BLOOD PRESSURE: 53 MMHG | SYSTOLIC BLOOD PRESSURE: 122 MMHG | WEIGHT: 194 LBS | BODY MASS INDEX: 26 KG/M2

## 2023-04-21 DIAGNOSIS — R10.32 LEFT LOWER QUADRANT ABDOMINAL PAIN: Primary | ICD-10-CM

## 2023-04-21 PROBLEM — G40.109 SYMPTOMATIC FOCAL EPILEPSY (HCC): Status: ACTIVE | Noted: 2023-04-21

## 2023-04-21 PROCEDURE — 99213 OFFICE O/P EST LOW 20 MIN: CPT | Performed by: INTERNAL MEDICINE

## 2023-04-21 PROCEDURE — 3074F SYST BP LT 130 MM HG: CPT | Performed by: INTERNAL MEDICINE

## 2023-04-21 PROCEDURE — 3078F DIAST BP <80 MM HG: CPT | Performed by: INTERNAL MEDICINE

## 2023-04-21 NOTE — PATIENT INSTRUCTIONS
Thank you for bringing the test in    Please see GI call the GI department get to see someone whoever can get you in first    Call me if any problems otherwise I like to see you again in July Silver    Have a great summer and let me know if your belly is still a problem    Go easy on what you eat avoid a lot of fiber for the next week or so

## 2023-04-24 LAB — OXCARBAZEPINE LVL: 27 UG/ML

## 2023-05-01 ENCOUNTER — LAB ENCOUNTER (OUTPATIENT)
Dept: LAB | Facility: HOSPITAL | Age: 38
End: 2023-05-01
Attending: INTERNAL MEDICINE
Payer: COMMERCIAL

## 2023-05-01 ENCOUNTER — OFFICE VISIT (OUTPATIENT)
Facility: CLINIC | Age: 38
End: 2023-05-01

## 2023-05-01 VITALS
DIASTOLIC BLOOD PRESSURE: 69 MMHG | HEIGHT: 73 IN | BODY MASS INDEX: 25.84 KG/M2 | WEIGHT: 195 LBS | HEART RATE: 64 BPM | SYSTOLIC BLOOD PRESSURE: 125 MMHG

## 2023-05-01 DIAGNOSIS — R93.3 ABNORMAL CT SCAN, COLON: Primary | ICD-10-CM

## 2023-05-01 DIAGNOSIS — R19.7 DIARRHEA, UNSPECIFIED TYPE: ICD-10-CM

## 2023-05-01 DIAGNOSIS — R19.8 BORBORYGMI: ICD-10-CM

## 2023-05-01 DIAGNOSIS — R10.32 LLQ DISCOMFORT: ICD-10-CM

## 2023-05-01 LAB — IGA SERPL-MCNC: 138 MG/DL (ref 70–312)

## 2023-05-01 PROCEDURE — 3078F DIAST BP <80 MM HG: CPT | Performed by: INTERNAL MEDICINE

## 2023-05-01 PROCEDURE — 99204 OFFICE O/P NEW MOD 45 MIN: CPT | Performed by: INTERNAL MEDICINE

## 2023-05-01 PROCEDURE — 83993 ASSAY FOR CALPROTECTIN FECAL: CPT

## 2023-05-01 PROCEDURE — 3008F BODY MASS INDEX DOCD: CPT | Performed by: INTERNAL MEDICINE

## 2023-05-01 PROCEDURE — 86364 TISS TRNSGLTMNASE EA IG CLAS: CPT | Performed by: INTERNAL MEDICINE

## 2023-05-01 PROCEDURE — 36415 COLL VENOUS BLD VENIPUNCTURE: CPT | Performed by: INTERNAL MEDICINE

## 2023-05-01 PROCEDURE — 82784 ASSAY IGA/IGD/IGG/IGM EACH: CPT | Performed by: INTERNAL MEDICINE

## 2023-05-01 PROCEDURE — 87493 C DIFF AMPLIFIED PROBE: CPT

## 2023-05-01 PROCEDURE — 3074F SYST BP LT 130 MM HG: CPT | Performed by: INTERNAL MEDICINE

## 2023-05-02 ENCOUNTER — HOSPITAL ENCOUNTER (OUTPATIENT)
Dept: GENERAL RADIOLOGY | Facility: HOSPITAL | Age: 38
Discharge: HOME | End: 2023-05-02
Attending: ORTHOPAEDIC SURGERY
Payer: COMMERCIAL

## 2023-05-02 ENCOUNTER — OFFICE VISIT (OUTPATIENT)
Dept: ORTHOPEDICS CLINIC | Facility: CLINIC | Age: 38
End: 2023-05-02

## 2023-05-02 ENCOUNTER — LAB ENCOUNTER (OUTPATIENT)
Dept: LAB | Facility: HOSPITAL | Age: 38
End: 2023-05-02
Attending: INTERNAL MEDICINE
Payer: COMMERCIAL

## 2023-05-02 VITALS — SYSTOLIC BLOOD PRESSURE: 125 MMHG | HEART RATE: 64 BPM | DIASTOLIC BLOOD PRESSURE: 75 MMHG

## 2023-05-02 DIAGNOSIS — R60.9 SWELLING: ICD-10-CM

## 2023-05-02 DIAGNOSIS — R19.7 DIARRHEA, UNSPECIFIED TYPE: Primary | ICD-10-CM

## 2023-05-02 DIAGNOSIS — M70.21 OLECRANON BURSITIS, RIGHT ELBOW: Primary | ICD-10-CM

## 2023-05-02 PROCEDURE — 20605 DRAIN/INJ JOINT/BURSA W/O US: CPT | Performed by: ORTHOPAEDIC SURGERY

## 2023-05-02 PROCEDURE — 3078F DIAST BP <80 MM HG: CPT | Performed by: ORTHOPAEDIC SURGERY

## 2023-05-02 PROCEDURE — 99244 OFF/OP CNSLTJ NEW/EST MOD 40: CPT | Performed by: ORTHOPAEDIC SURGERY

## 2023-05-02 PROCEDURE — 3074F SYST BP LT 130 MM HG: CPT | Performed by: ORTHOPAEDIC SURGERY

## 2023-05-02 PROCEDURE — 73080 X-RAY EXAM OF ELBOW: CPT | Performed by: ORTHOPAEDIC SURGERY

## 2023-05-02 RX ORDER — TRIAMCINOLONE ACETONIDE 40 MG/ML
20 INJECTION, SUSPENSION INTRA-ARTICULAR; INTRAMUSCULAR ONCE
Status: COMPLETED | OUTPATIENT
Start: 2023-05-02 | End: 2023-05-02

## 2023-05-02 NOTE — PROGRESS NOTES
Per verbal order from Dr. Antonio Li 0.5 mL of kenalog 40mg/1mL was drawn up for injection into patients right elbow by provider. Blood pressure, heart rate, and procedural consent obtained prior to the injection.      Christian Barrett, RN

## 2023-05-03 LAB — C DIFF TOX B STL QL: NEGATIVE

## 2023-05-04 LAB — TTG IGA SER-ACNC: 0.2 U/ML (ref ?–7)

## 2023-05-05 ENCOUNTER — TELEPHONE (OUTPATIENT)
Dept: GASTROENTEROLOGY | Facility: CLINIC | Age: 38
End: 2023-05-05

## 2023-05-05 LAB — CALPROTECTIN STL-MCNT: <5 ΜG/G (ref ?–50)

## 2023-05-05 NOTE — TELEPHONE ENCOUNTER
Patient stopped by the clinic to give Dr Kalyan Ghosh copies of an ER visit from [address] [place] [address] were placed on Dr Sara Reed destammi for review with Med scan sheet

## 2023-05-08 ENCOUNTER — PATIENT MESSAGE (OUTPATIENT)
Facility: CLINIC | Age: 38
End: 2023-05-08

## 2023-05-09 NOTE — TELEPHONE ENCOUNTER
From: Todd Hand  To: Ck Ramos MD  Sent: 5/8/2023 9:49 AM CDT  Subject: Forgot to add this image from yesterday from current stomach issues    Dr Shawn Bosch, I tried sending this one to the previous email here is the other one from yesterday I was trying to add. I am sorry.  Mel Nicole

## 2023-08-01 ENCOUNTER — OFFICE VISIT (OUTPATIENT)
Dept: ORTHOPEDICS CLINIC | Facility: CLINIC | Age: 38
End: 2023-08-01

## 2023-08-01 ENCOUNTER — HOSPITAL ENCOUNTER (OUTPATIENT)
Dept: GENERAL RADIOLOGY | Facility: HOSPITAL | Age: 38
Discharge: HOME | End: 2023-08-01
Attending: ORTHOPAEDIC SURGERY
Payer: COMMERCIAL

## 2023-08-01 VITALS — DIASTOLIC BLOOD PRESSURE: 80 MMHG | HEART RATE: 61 BPM | SYSTOLIC BLOOD PRESSURE: 124 MMHG

## 2023-08-01 DIAGNOSIS — M75.21 BICEPS TENDINITIS OF RIGHT SHOULDER: ICD-10-CM

## 2023-08-01 DIAGNOSIS — M70.21 OLECRANON BURSITIS, RIGHT ELBOW: ICD-10-CM

## 2023-08-01 DIAGNOSIS — M25.511 RIGHT SHOULDER PAIN, UNSPECIFIED CHRONICITY: ICD-10-CM

## 2023-08-01 DIAGNOSIS — M25.511 RIGHT SHOULDER PAIN, UNSPECIFIED CHRONICITY: Primary | ICD-10-CM

## 2023-08-01 PROCEDURE — 3079F DIAST BP 80-89 MM HG: CPT

## 2023-08-01 PROCEDURE — 99213 OFFICE O/P EST LOW 20 MIN: CPT | Performed by: ORTHOPAEDIC SURGERY

## 2023-08-01 PROCEDURE — 73030 X-RAY EXAM OF SHOULDER: CPT | Performed by: ORTHOPAEDIC SURGERY

## 2023-08-01 PROCEDURE — 3074F SYST BP LT 130 MM HG: CPT

## 2023-08-01 PROCEDURE — 20605 DRAIN/INJ JOINT/BURSA W/O US: CPT

## 2023-08-01 RX ORDER — TRIAMCINOLONE ACETONIDE 40 MG/ML
20 INJECTION, SUSPENSION INTRA-ARTICULAR; INTRAMUSCULAR ONCE
Status: COMPLETED | OUTPATIENT
Start: 2023-08-01 | End: 2023-08-01

## 2023-08-01 NOTE — PROGRESS NOTES
NURSING INTAKE COMMENTS: Patient presents with:  Elbow Pain: R elbow olecranon bursitis :Pain with repetive motions pain is radiating up to shoulder- Pt is very active and elbow is bothering- would like injection and asperation. Cortisone inj and aspiration on 05/02. XR done 05/02      HPI: This 43year old male presents today with his mother for recurrent aseptic olecranon bursitis right elbow. He also had a new complaint of what I believed to be bicep tendinitis of the right shoulder that is mild. There was no trauma. His mom said he does rest with his right elbow on the tabletop a lot. Past Medical History:   Diagnosis Date    Depression     Epilepsy ([address]     Seizure disorder ([facility]     Seizures ([facility] 2006; 2009 (hospitalization)     Past Surgical History:   Procedure Laterality Date    BRAIN SURGERY Right 8/89    Subarachnoid Cyst    OTHER  1989    brain surgery     Current Outpatient Medications   Medication Sig Dispense Refill    Sildenafil Citrate (VIAGRA) 100 MG Oral Tab Take 1 tablet (100 mg total) by mouth daily as needed for Erectile Dysfunction. 90 tablet 1    acetaminophen 325 MG Oral Tab Take 1 tablet (325 mg total) by mouth every 6 (six) hours as needed for Pain. BRIVIACT 50 MG Oral Tab Take 50 mg by mouth nightly. 5    LORazepam 0.5 MG Oral Tab Take 1 tablet (0.5 mg total) by mouth as needed (prior to onset of seizure). 3    OXcarbazepine (TRILEPTAL OR) Take 900 mg by mouth. Pt takes 900 mg in am and 600mg at night.          Ciprofloxacin           OTHER (SEE COMMENTS)    Comment:Lower his seizure meds  Penicillins             RASH  Sulfa Antibiotics       RASH  Latex                   RASH  Family History   Problem Relation Age of Onset    Thyroid Disorder Mother         hypothyroid    Cancer Maternal Grandmother         lung cancer    Lipids Maternal Grandmother         hyperlipidemia    Neurological Disorder Maternal Grandfather         Alzheimers    Diabetes Paternal Grandmother Hypertension Paternal Grandmother     Cancer Paternal Grandmother         lung    Asthma Sister      No family Hx of DVT/PE    Social History    Occupational History      Not on file    Tobacco Use      Smoking status: Former      Smokeless tobacco: Never      Tobacco comments: quit 2 weeks ago    Substance and Sexual Activity      Alcohol use: Yes        Alcohol/week: 0.0 standard drinks of alcohol        Comment: beer - rarely      Drug use: No      Sexual activity: Not on file       Review of Systems:  GENERAL: feels generally well, no significant weight loss or weight gain  SKIN: no ulcerated or worrisome skin lesions  EYES:denies blurred vision or double vision  HEENT: denies new nasal congestion, sinus pain or ST  LUNGS: denies shortness of breath  CARDIOVASCULAR: denies chest pain  GI: no hematemesis, no worsening heartburn, no diarrhea  : no dysuria, no blood in urine, no difficulty urinating, no incontinence  MUSCULOSKELETAL: no other musculoskeletal complaints other than in HPI  NEURO: no numbness or tingling, no weakness or balance disorder  PSYCHE: no depression or anxiety  HEMATOLOGIC: no hx of blood dyscrasia, no Hx DVT/PE  ENDOCRINE: no thyroid or diabetes issues  ALL/ASTHMA: no new hx of severe allergy or asthma    Physical Examination:    /80   Pulse 61   Constitutional: appears well hydrated, alert and responsive, no acute distress noted  Extremities: The right shoulder had no deformity. His left arm is a little more atrophic than the right due to a brain injury and surgery. Strength of the right shoulder rotator cuff for all 4 tendons was normal.  Mild biceps provocative test and tenderness but the rotator cuff and impingement sign was negative. No acromioclavicular tenderness. Musculoskeletal: Right elbow with full range of motion no tenderness. Mild aseptic olecranon bursitis. Neurological: Normal motor and sensory right upper extremity.     Imaging: X-ray of the right shoulder today was normal.      XR SHOULDER, COMPLETE (MIN 2 VIEWS), RIGHT (CPT=73030)    Result Date: 8/1/2023  PROCEDURE: XR SHOULDER, COMPLETE (MIN 2 VIEWS), RIGHT (CPT=73030)  COMPARISON: None. INDICATIONS: Right anterior shoulder pain x3 weeks. No known injury. TECHNIQUE: Two views were obtained. FINDINGS:  BONES: Normal. No significant arthropathy, fracture or acute abnormality. SOFT TISSUES: Negative. No visible soft tissue swelling. EFFUSION: None visible. OTHER: Negative. CONCLUSION: Normal examination. Dictated by (CST): Stanley Newsome MD on 8/01/2023 at 5:11 PM     Finalized by (CST): Stanley Newsome MD on 8/01/2023 at 5:11 PM             Lab Results   Component Value Date    WBC 6.0 02/09/2023    HGB 14.8 02/09/2023    .0 02/09/2023      Lab Results   Component Value Date    GLU 85 02/09/2023    BUN 12 02/09/2023    CREATSERUM 0.87 02/09/2023    GFRNAA 112 10/20/2021    GFRAA 129 10/20/2021        Assessment and Plan:  Diagnoses and all orders for this visit:    Right shoulder pain, unspecified chronicity  -     XR SHOULDER, COMPLETE (MIN 2 VIEWS), RIGHT (CPT=73030); Future    Olecranon bursitis, right elbow  -     DRAIN/INJECT MEDIUM JOINT/BURSA  -     triamcinolone acetonide (Kenalog-40) 40 MG/ML injection 20 mg    Biceps tendinitis of right shoulder        Assessment: Biceps tendinitis right shoulder and recurrent aseptic olecranon bursitis right elbow    Plan: For the elbow I recommended recurrent aspiration. The last one was in May 2023. Aspiration yielded 9 cc normal bursal fluid and I injected half cc Kenalog. He will wear his elbow sleeve during the daytime but not for sleep. If it comes back, we talked about the possibility of surgical excision. For the right shoulder, I recommended ibuprofen 600 mg with breakfast and 600 mg with dinner. I told him to stop if his stomach became upset or after 3 weeks, which ever was sooner.   If the pain persists, we can try cortisone injection and therapy. For now I will see him as needed. Follow Up: No follow-ups on file.     Ava Bone MD

## 2023-08-01 NOTE — PROCEDURES
Per verbal order from LANDY Adam, draw up 0.5  ml of Kenalog 40 for cortisone injection to right elbow Nahid PRABHAKAR CMA  Patient provided education handout for cortisone injection.

## 2023-10-08 ENCOUNTER — OFFICE VISIT (OUTPATIENT)
Dept: FAMILY MEDICINE CLINIC | Facility: CLINIC | Age: 38
End: 2023-10-08
Payer: COMMERCIAL

## 2023-10-08 VITALS
HEIGHT: 73 IN | DIASTOLIC BLOOD PRESSURE: 74 MMHG | BODY MASS INDEX: 26.77 KG/M2 | RESPIRATION RATE: 16 BRPM | HEART RATE: 65 BPM | WEIGHT: 202 LBS | TEMPERATURE: 99 F | SYSTOLIC BLOOD PRESSURE: 120 MMHG | OXYGEN SATURATION: 98 %

## 2023-10-08 DIAGNOSIS — J40 BRONCHITIS: Primary | ICD-10-CM

## 2023-10-08 PROBLEM — M71.9 BURSITIS: Status: ACTIVE | Noted: 2023-10-08

## 2023-10-08 PROBLEM — G40.909 EPILEPTIC SEIZURE (HCC): Status: ACTIVE | Noted: 2023-04-21

## 2023-10-08 PROCEDURE — 3078F DIAST BP <80 MM HG: CPT

## 2023-10-08 PROCEDURE — 3074F SYST BP LT 130 MM HG: CPT

## 2023-10-08 PROCEDURE — 99213 OFFICE O/P EST LOW 20 MIN: CPT

## 2023-10-08 PROCEDURE — 3008F BODY MASS INDEX DOCD: CPT

## 2023-10-08 RX ORDER — ALBUTEROL SULFATE 90 UG/1
2 AEROSOL, METERED RESPIRATORY (INHALATION) EVERY 4 HOURS PRN
Qty: 1 EACH | Refills: 0 | Status: SHIPPED | OUTPATIENT
Start: 2023-10-08 | End: 2023-10-15

## 2023-10-08 RX ORDER — OXCARBAZEPINE 300 MG
1500 TABLET ORAL DAILY
COMMUNITY
Start: 2023-09-14

## 2023-10-08 RX ORDER — BENZONATATE 200 MG/1
200 CAPSULE ORAL 3 TIMES DAILY PRN
Qty: 21 CAPSULE | Refills: 0 | Status: SHIPPED | OUTPATIENT
Start: 2023-10-08 | End: 2023-10-15

## 2023-10-08 NOTE — PATIENT INSTRUCTIONS
Mucinex during the day. Saline sinus rinses at night time with benzonatate. Inhaler 1-2 puffs every 4 hours during day for 10/08 and 10/09 may decrease as tolerated. Has The Growth Been Previously Biopsied?: has been previously biopsied [id number]: previous_biopsy_has_been_previously_biopsied

## 2024-05-25 ENCOUNTER — PATIENT MESSAGE (OUTPATIENT)
Dept: NEPHROLOGY | Facility: CLINIC | Age: 39
End: 2024-05-25

## 2024-05-28 NOTE — TELEPHONE ENCOUNTER
From: Silver Gutierrez  To: Facundo Matthews  Sent: 5/25/2024 11:52 AM CDT  Subject: Non urgent question regarding my kidneys and lump underneath the skin on front of my leg    Dr. Matthews,    Thank your all that you and your staff do and I hope you are doing well!    I have two quick questions for you.    1) Lately when I have been using the restroom there are times that I go and within about 5 to 10 minutes I feel like I haven't fully gone and go a little more. Now that I am 39 could this be an issue stemming from my kidneys or an overactive bladder?     2) I have a hard lump underneath my skin that moves around when I touch it on the front of my shin. I noticed it about two weeks ago. There is no redness, no swelling, or or my leg does not feel hot to the touch. I have the same Factor V Leiden gene my dad. Would this be something a a dermatologist would look at or do I need to make an appointment with a Hematologist?     I have an appointment on July 18th with you. I hope you are having a great start to your summer! I ended up not moving to [place] unfortunately balancing life here and my mom not wanting me to move jen ruined everything.     Best Regards,    Silver

## 2024-07-10 ENCOUNTER — OFFICE VISIT (OUTPATIENT)
Dept: NEPHROLOGY | Facility: CLINIC | Age: 39
End: 2024-07-10

## 2024-07-10 VITALS
BODY MASS INDEX: 27.43 KG/M2 | HEIGHT: 73 IN | SYSTOLIC BLOOD PRESSURE: 129 MMHG | HEART RATE: 67 BPM | WEIGHT: 207 LBS | DIASTOLIC BLOOD PRESSURE: 70 MMHG

## 2024-07-10 DIAGNOSIS — N40.1 BENIGN PROSTATIC HYPERPLASIA (BPH) WITH STRAINING ON URINATION: ICD-10-CM

## 2024-07-10 DIAGNOSIS — R39.16 BENIGN PROSTATIC HYPERPLASIA (BPH) WITH STRAINING ON URINATION: ICD-10-CM

## 2024-07-10 DIAGNOSIS — Z00.00 HEALTH MAINTENANCE EXAMINATION: ICD-10-CM

## 2024-07-10 DIAGNOSIS — G40.909 SEIZURE DISORDER (HCC): Primary | ICD-10-CM

## 2024-07-10 DIAGNOSIS — Z12.5 PROSTATE CANCER SCREENING: ICD-10-CM

## 2024-07-10 PROCEDURE — 99214 OFFICE O/P EST MOD 30 MIN: CPT | Performed by: INTERNAL MEDICINE

## 2024-07-10 PROCEDURE — 3074F SYST BP LT 130 MM HG: CPT | Performed by: INTERNAL MEDICINE

## 2024-07-10 PROCEDURE — 3008F BODY MASS INDEX DOCD: CPT | Performed by: INTERNAL MEDICINE

## 2024-07-10 PROCEDURE — 3078F DIAST BP <80 MM HG: CPT | Performed by: INTERNAL MEDICINE

## 2024-07-10 RX ORDER — TAMSULOSIN HYDROCHLORIDE 0.4 MG/1
0.4 CAPSULE ORAL DAILY
Qty: 30 CAPSULE | Refills: 3 | Status: SHIPPED | OUTPATIENT
Start: 2024-07-10 | End: 2024-08-09

## 2024-07-10 NOTE — PROGRESS NOTES
Progress Note     Silver Gutierrez    Is here doing well just got a new dog named Pan here with his mother Merle having a good summer feeling good overall no epilepsy seizures since last August due to see his doctor at [facility] soon we will check a Trileptal level on him he is taking Trileptal as well as  Biavax.  Having some decreased urination with problems with flow has had epididymitis in the past no blood in his urine  Up-to-date with his skin does need to see eye doctor up-to-date with vaccines mood is good    HISTORY:  Past Medical History:    Depression    Epilepsy (HCC)    Seizure disorder (HCC)    Seizures (HCC)      Past Surgical History:   Procedure Laterality Date    Brain surgery Right 8/89    Subarachnoid Cyst    Other  1989    brain surgery      Social History     Tobacco Use    Smoking status: Former    Smokeless tobacco: Never    Tobacco comments:     quit 2 weeks ago   Substance Use Topics    Alcohol use: Yes     Alcohol/week: 0.0 standard drinks of alcohol     Comment: beer - rarely         Medications (Active prior to today's visit):  Current Outpatient Medications   Medication Sig Dispense Refill    tamsulosin 0.4 MG Oral Cap Take 1 capsule (0.4 mg total) by mouth daily. 30 capsule 3    Sildenafil Citrate (VIAGRA) 100 MG Oral Tab Take 1 tablet (100 mg total) by mouth daily as needed for Erectile Dysfunction. 90 tablet 1    acetaminophen 325 MG Oral Tab Take 1 tablet (325 mg total) by mouth every 6 (six) hours as needed for Pain.      BRIVIACT 50 MG Oral Tab Take 50 mg by mouth nightly.  5    LORazepam 0.5 MG Oral Tab Take 1 tablet (0.5 mg total) by mouth as needed (prior to onset of seizure).  3    OXcarbazepine (TRILEPTAL OR) Take 900 mg by mouth. Pt takes 900 mg in am and 600mg at night.         Allergies:  Allergies   Allergen Reactions    Ciprofloxacin OTHER (SEE COMMENTS)     Lower his seizure meds     Penicillins RASH    Sulfa Antibiotics RASH    Latex RASH         ROS:     Constitutional:   Negative for decreased activity, fever, irritability and lethargy feels great  ENMT:  Negative for ear drainage, hearing loss and nasal drainage  Eyes:  Negative for eye discharge and vision loss  Cardiovascular:  Negative for chest pain, sob  Respiratory:  Negative for cough, dyspnea and wheezing  Gastrointestinal:  Negative for abdominal pain, constipation  Genitourinary:  Negative for dysuria and hematuria having some decreased urinary flow  Endocrine:  Negative for abnormal sleep patterns  Hema/Lymph:  Negative for easy bleeding and easy bruising  Integumentary:  Negative for pruritus and rash  Musculoskeletal:  Negative for bone/joint symptoms  Neurological:  Negative for gait disturbance  Psychiatric:  Negative for inappropriate interaction and psychiatric symptoms      Vitals:    07/10/24 1643   BP: 129/70   Pulse: 67       PHYSICAL EXAM:   Constitutional: appears well hydrated alert and responsive looks great  Head/Face: normocephalic  Eyes/Vision: normal extraocular motion is intact  Nose/Mouth/Throat:mucous membranes are moist   Neck/Thyroid: neck is supple without adenopathy  Lymphatic: no abnormal cervical, supraclavicular adenopathy is noted  Respiratory:  lungs are clear to auscultation bilaterally  Cardiovascular: regular rate and rhythm   Abdomen: soft, non-tender, non-distended, BS normal  Skin/Hair: no unusual rashes present, no abnormal bruising noted  Back/Spine: no abnormalities noted  Musculoskeletal: no deformities  Extremities: no edema  Neurological:  Grossly normal  Skin intact     ASSESSMENT/PLAN:   Assessment   Encounter Diagnoses   Name Primary?    Seizure disorder (HCC) Yes    Health maintenance examination     Prostate cancer screening     Benign prostatic hyperplasia (BPH) with straining on urination        1 health maintenance up-to-date with everything  Wear sunscreen keep an eye on skin see eye doctor  #2 epilepsy check Trileptal level keep seizure medicine same follow-up with   [facility]  3 BPH check a full set of labs check PSA no history of prostate cancer or colon cancer  Add Flomax 0.4 every morning watch for dizziness or retrograde ejaculation will touch base with me in a few weeks after labs are done see me back in 1 year         Orders This Visit:  Orders Placed This Encounter   Procedures    CBC With Differential With Platelet    Comp Metabolic Panel (14)    Lipid Panel    Urinalysis, Routine    Assay, Thyroid Stim Hormone    PSA Total, Screen    Oxcarbazepine (Trileptal), Serum       Meds This Visit:  Requested Prescriptions     Signed Prescriptions Disp Refills    tamsulosin 0.4 MG Oral Cap 30 capsule 3     Sig: Take 1 capsule (0.4 mg total) by mouth daily.       Imaging & Referrals:  None     7/10/2024  Facundo Matthews MD

## 2024-07-13 ENCOUNTER — LAB ENCOUNTER (OUTPATIENT)
Dept: LAB | Facility: HOSPITAL | Age: 39
End: 2024-07-13
Attending: INTERNAL MEDICINE
Payer: COMMERCIAL

## 2024-07-13 DIAGNOSIS — Z00.00 HEALTH MAINTENANCE EXAMINATION: ICD-10-CM

## 2024-07-13 DIAGNOSIS — G40.909 SEIZURE DISORDER (HCC): ICD-10-CM

## 2024-07-13 DIAGNOSIS — Z12.5 PROSTATE CANCER SCREENING: ICD-10-CM

## 2024-07-13 LAB
ALBUMIN SERPL-MCNC: 4.6 G/DL (ref 3.2–4.8)
ALBUMIN/GLOB SERPL: 1.5 {RATIO} (ref 1–2)
ALP LIVER SERPL-CCNC: 87 U/L
ALT SERPL-CCNC: 22 U/L
ANION GAP SERPL CALC-SCNC: 2 MMOL/L (ref 0–18)
AST SERPL-CCNC: 27 U/L (ref ?–34)
BASOPHILS # BLD AUTO: 0.04 X10(3) UL (ref 0–0.2)
BASOPHILS NFR BLD AUTO: 1.1 %
BILIRUB SERPL-MCNC: 0.5 MG/DL (ref 0.3–1.2)
BILIRUB UR QL: NEGATIVE
BUN BLD-MCNC: 8 MG/DL (ref 9–23)
BUN/CREAT SERPL: 9.2 (ref 10–20)
CALCIUM BLD-MCNC: 9.5 MG/DL (ref 8.7–10.4)
CHLORIDE SERPL-SCNC: 101 MMOL/L (ref 98–112)
CHOLEST SERPL-MCNC: 187 MG/DL (ref ?–200)
CLARITY UR: CLEAR
CO2 SERPL-SCNC: 32 MMOL/L (ref 21–32)
COMPLEXED PSA SERPL-MCNC: 0.29 NG/ML (ref ?–4)
CREAT BLD-MCNC: 0.87 MG/DL
DEPRECATED RDW RBC AUTO: 39.3 FL (ref 35.1–46.3)
EGFRCR SERPLBLD CKD-EPI 2021: 112 ML/MIN/1.73M2 (ref 60–?)
EOSINOPHIL # BLD AUTO: 0.38 X10(3) UL (ref 0–0.7)
EOSINOPHIL NFR BLD AUTO: 10.1 %
ERYTHROCYTE [DISTWIDTH] IN BLOOD BY AUTOMATED COUNT: 11.8 % (ref 11–15)
FASTING PATIENT LIPID ANSWER: YES
FASTING STATUS PATIENT QL REPORTED: YES
GLOBULIN PLAS-MCNC: 3 G/DL (ref 2–3.5)
GLUCOSE BLD-MCNC: 91 MG/DL (ref 70–99)
GLUCOSE UR-MCNC: NORMAL MG/DL
HCT VFR BLD AUTO: 44.3 %
HDLC SERPL-MCNC: 61 MG/DL (ref 40–59)
HGB BLD-MCNC: 15.3 G/DL
HGB UR QL STRIP.AUTO: NEGATIVE
IMM GRANULOCYTES # BLD AUTO: 0 X10(3) UL (ref 0–1)
IMM GRANULOCYTES NFR BLD: 0 %
KETONES UR-MCNC: NEGATIVE MG/DL
LDLC SERPL CALC-MCNC: 103 MG/DL (ref ?–100)
LEUKOCYTE ESTERASE UR QL STRIP.AUTO: NEGATIVE
LYMPHOCYTES # BLD AUTO: 1.31 X10(3) UL (ref 1–4)
LYMPHOCYTES NFR BLD AUTO: 34.8 %
MCH RBC QN AUTO: 31.4 PG (ref 26–34)
MCHC RBC AUTO-ENTMCNC: 34.5 G/DL (ref 31–37)
MCV RBC AUTO: 90.8 FL
MONOCYTES # BLD AUTO: 0.37 X10(3) UL (ref 0.1–1)
MONOCYTES NFR BLD AUTO: 9.8 %
NEUTROPHILS # BLD AUTO: 1.66 X10 (3) UL (ref 1.5–7.7)
NEUTROPHILS # BLD AUTO: 1.66 X10(3) UL (ref 1.5–7.7)
NEUTROPHILS NFR BLD AUTO: 44.2 %
NITRITE UR QL STRIP.AUTO: NEGATIVE
NONHDLC SERPL-MCNC: 126 MG/DL (ref ?–130)
OSMOLALITY SERPL CALC.SUM OF ELEC: 278 MOSM/KG (ref 275–295)
PH UR: 7 [PH] (ref 5–8)
PLATELET # BLD AUTO: 180 10(3)UL (ref 150–450)
POTASSIUM SERPL-SCNC: 4.4 MMOL/L (ref 3.5–5.1)
PROT SERPL-MCNC: 7.6 G/DL (ref 5.7–8.2)
PROT UR-MCNC: NEGATIVE MG/DL
RBC # BLD AUTO: 4.88 X10(6)UL
SODIUM SERPL-SCNC: 135 MMOL/L (ref 136–145)
SP GR UR STRIP: 1.02 (ref 1–1.03)
TRIGL SERPL-MCNC: 134 MG/DL (ref 30–149)
TSI SER-ACNC: 1.97 MIU/ML (ref 0.55–4.78)
UROBILINOGEN UR STRIP-ACNC: NORMAL
VLDLC SERPL CALC-MCNC: 22 MG/DL (ref 0–30)
WBC # BLD AUTO: 3.8 X10(3) UL (ref 4–11)

## 2024-07-13 PROCEDURE — 36415 COLL VENOUS BLD VENIPUNCTURE: CPT

## 2024-07-13 PROCEDURE — 84443 ASSAY THYROID STIM HORMONE: CPT | Performed by: INTERNAL MEDICINE

## 2024-07-13 PROCEDURE — 80053 COMPREHEN METABOLIC PANEL: CPT | Performed by: INTERNAL MEDICINE

## 2024-07-13 PROCEDURE — 80183 DRUG SCRN QUANT OXCARBAZEPIN: CPT

## 2024-07-13 PROCEDURE — 80061 LIPID PANEL: CPT | Performed by: INTERNAL MEDICINE

## 2024-07-13 PROCEDURE — 81003 URINALYSIS AUTO W/O SCOPE: CPT | Performed by: INTERNAL MEDICINE

## 2024-07-13 PROCEDURE — 85025 COMPLETE CBC W/AUTO DIFF WBC: CPT | Performed by: INTERNAL MEDICINE

## 2024-07-19 LAB — OXCARBAZEPINE LVL: 21 UG/ML

## 2025-02-04 ENCOUNTER — WALK IN (OUTPATIENT)
Dept: URGENT CARE | Age: 40
End: 2025-02-04
Attending: EMERGENCY MEDICINE

## 2025-02-04 ENCOUNTER — HOSPITAL ENCOUNTER (OUTPATIENT)
Dept: GENERAL RADIOLOGY | Age: 40
Discharge: HOME | End: 2025-02-04
Attending: EMERGENCY MEDICINE

## 2025-02-04 VITALS
WEIGHT: 205 LBS | OXYGEN SATURATION: 98 % | HEART RATE: 82 BPM | DIASTOLIC BLOOD PRESSURE: 80 MMHG | BODY MASS INDEX: 27.05 KG/M2 | RESPIRATION RATE: 16 BRPM | TEMPERATURE: 99 F | SYSTOLIC BLOOD PRESSURE: 133 MMHG

## 2025-02-04 DIAGNOSIS — S93.402A SPRAIN OF LEFT ANKLE, UNSPECIFIED LIGAMENT, INITIAL ENCOUNTER: ICD-10-CM

## 2025-02-04 DIAGNOSIS — S93.402A SPRAIN OF LEFT ANKLE, UNSPECIFIED LIGAMENT, INITIAL ENCOUNTER: Primary | ICD-10-CM

## 2025-02-04 PROCEDURE — 73610 X-RAY EXAM OF ANKLE: CPT

## 2025-02-04 PROCEDURE — 99213 OFFICE O/P EST LOW 20 MIN: CPT

## 2025-02-04 ASSESSMENT — PAIN SCALES - GENERAL
PAINLEVEL_OUTOF10: 6
PAINLEVEL: 6

## 2025-02-11 ENCOUNTER — OFFICE VISIT (OUTPATIENT)
Dept: ORTHOPEDICS CLINIC | Facility: CLINIC | Age: 40
End: 2025-02-11
Payer: COMMERCIAL

## 2025-02-11 ENCOUNTER — HOSPITAL ENCOUNTER (OUTPATIENT)
Dept: GENERAL RADIOLOGY | Facility: HOSPITAL | Age: 40
Discharge: HOME | End: 2025-02-11
Attending: ORTHOPAEDIC SURGERY
Payer: COMMERCIAL

## 2025-02-11 DIAGNOSIS — S93.422D SPRAIN OF DELTOID LIGAMENT OF LEFT ANKLE, SUBSEQUENT ENCOUNTER: Primary | ICD-10-CM

## 2025-02-11 DIAGNOSIS — M25.572 LEFT ANKLE PAIN, UNSPECIFIED CHRONICITY: ICD-10-CM

## 2025-02-11 PROCEDURE — 73610 X-RAY EXAM OF ANKLE: CPT | Performed by: ORTHOPAEDIC SURGERY

## 2025-02-11 PROCEDURE — 99214 OFFICE O/P EST MOD 30 MIN: CPT | Performed by: ORTHOPAEDIC SURGERY

## 2025-02-11 RX ORDER — ASPIRIN 81 MG/1
81 TABLET ORAL 2 TIMES DAILY WITH MEALS
Qty: 24 TABLET | Refills: 0 | Status: SHIPPED | OUTPATIENT
Start: 2025-02-11

## 2025-02-12 NOTE — H&P
NURSING INTAKE COMMENTS:   Chief Complaint   Patient presents with    Injury     Left ankle - onset 2/3/25 while playing basketball he rolled his ankle - was in [facility] outside the system - had x-rays done - has a disc with images - has a CAM boot on - still has pain and swelling in the foot and ankle and the bruising went up to the back of his leg - rates pain as 5/10 on and off - states this is a work comp case and he will fill out the forms at the         HPI: This 40 year old male presents today with his mother for Workmen's Compensation injury to his left ankle.  On 2/3/2025, he was playing basketball with the teachers versus the students.  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  He rolled his ankle inward and then it rolled outward.  He had significant swelling and pain and had to stop playing.  The next day he went to urgent care and x-rays were available.  The reports were.  To my reading, he had some mild widening of the medial clear space and slight tilt of the mortise.  Today's x-rays in the standing position however were normal.  He is tender on both the medial and lateral sides of the ankle.  1 week later he still has very significant swelling but no blistering or shiny skin.  She denies chest pain or shortness of breath.    Medically he has a factor V Leiden deficiency.  His father  of a blood clot.  He denies significant problems left ankle prior to this injury.  He works for the school [employer] in the Downs syndrome transition program.  He and his mom have a house with stairs.  He drives.  His hobbies include basketball and golf.  He has a seizure disorder from an arachnoid cyst that was resected from his brain.  He is a non-smoker.  He does not have diabetes.    Past Medical History:    Depression    Epilepsy (HCC)    Seizure disorder (HCC)    Seizures (HCC)     Past Surgical History:   Procedure Laterality Date    Brain surgery Right     Subarachnoid Cyst     Other  1989    brain surgery     Current Outpatient Medications   Medication Sig Dispense Refill    aspirin 81 MG Oral Tab EC Take 1 tablet (81 mg total) by mouth 2 (two) times daily with meals. Start dinner tonight then breakfast/dinner for 12 days. 24 tablet 0    Sildenafil Citrate (VIAGRA) 100 MG Oral Tab Take 1 tablet (100 mg total) by mouth daily as needed for Erectile Dysfunction. 90 tablet 1    acetaminophen 325 MG Oral Tab Take 1 tablet (325 mg total) by mouth every 6 (six) hours as needed for Pain.      BRIVIACT 50 MG Oral Tab Take 50 mg by mouth nightly.  5    LORazepam 0.5 MG Oral Tab Take 1 tablet (0.5 mg total) by mouth as needed (prior to onset of seizure).  3    OXcarbazepine (TRILEPTAL OR) Take 900 mg by mouth. Pt takes 900 mg in am and 600mg at night.       Allergies[1]  Family History   Problem Relation Age of Onset    Thyroid Disorder Mother         hypothyroid    Cancer Maternal Grandmother         lung cancer    Lipids Maternal Grandmother         hyperlipidemia    Neurological Disorder Maternal Grandfather         Alzheimers    Diabetes Paternal Grandmother     Hypertension Paternal Grandmother     Cancer Paternal Grandmother         lung    Asthma Sister      No family Hx of DVT/PE    Social History     Occupational History    Not on file   Tobacco Use    Smoking status: Former    Smokeless tobacco: Never    Tobacco comments:     quit 2 weeks ago   Substance and Sexual Activity    Alcohol use: Yes     Alcohol/week: 0.0 standard drinks of alcohol     Comment: beer - rarely    Drug use: No    Sexual activity: Not on file        Review of Systems:  GENERAL: feels generally well, no significant weight loss or weight gain  SKIN: no ulcerated or worrisome skin lesions  EYES:denies blurred vision or double vision  HEENT: denies new nasal congestion, sinus pain or ST  LUNGS: denies shortness of breath  CARDIOVASCULAR: denies chest pain  GI: no hematemesis, no worsening heartburn, no diarrhea  : no  dysuria, no blood in urine, no difficulty urinating, no incontinence  MUSCULOSKELETAL: no other musculoskeletal complaints other than in HPI  NEURO: no numbness or tingling, no weakness or balance disorder  PSYCHE: no depression or anxiety  HEMATOLOGIC: no hx of blood dyscrasia, no Hx DVT/PE  ENDOCRINE: no thyroid or diabetes issues  ALL/ASTHMA: no new hx of severe allergy or asthma    Physical Examination:    There were no vitals taken for this visit.  Constitutional: appears well hydrated, alert and responsive, no acute distress noted  Extremities: Swelling is moderate on the entire ankle and foot.  No skin breakdown or shiny skin.  Denies numbness and tingling entire left lower extremity, ankle, and foot.  Musculoskeletal: Decreased motion due to the swelling and left ankle.  Shuck test with increased excursion compared to right ankle.  The upper two thirds of his leg had no swelling and no tenderness including the calf.  No pitting edema in the leg.  Neurological: Normal motor and sensory to the foot and toes within the constraints of the swelling of the ankle.    Imaging: Today standing x-rays showed a normal ankle mortise and syndesmosis and no fractures.  The x-rays from the outside facility on 2/5/2025 showed instability in my opinion.  The radiology report was not available.      No results found.     Lab Results   Component Value Date    WBC 3.8 (L) 07/13/2024    HGB 15.3 07/13/2024    .0 07/13/2024      Lab Results   Component Value Date    GLU 91 07/13/2024    BUN 8 (L) 07/13/2024    CREATSERUM 0.87 07/13/2024    GFRNAA 112 10/20/2021    GFRAA 129 10/20/2021        Assessment and Plan:  Diagnoses and all orders for this visit:    Sprain of deltoid ligament of left ankle, subsequent encounter  -     MRI ANKLE, LEFT (CPT=73721); Future  -     PHYSICAL THERAPY - INTERNAL  -     DME - External  -     aspirin 81 MG Oral Tab EC; Take 1 tablet (81 mg total) by mouth 2 (two) times daily with meals. Start  dinner tonight then breakfast/dinner for 12 days.    Left ankle pain, unspecified chronicity  -     XR ANKLE WEIGHTBEARING (3 VIEWS), LEFT (CPT=73610); Future  -     PHYSICAL THERAPY - INTERNAL  -     DME - External  -     aspirin 81 MG Oral Tab EC; Take 1 tablet (81 mg total) by mouth 2 (two) times daily with meals. Start dinner tonight then breakfast/dinner for 12 days.        Assessment: By exam and x-rays, he appears to have injured both sets of ligaments medially and laterally and may have instability.    Plan: I recommended MRI left ankle.  He already has a walker boot.  He may be weight-bear as time with his crutches.  We also recommend rolling kneeling walker.  While he does not have evidence of DVT currently, because of the factor V Leiden deficiency, I recommended aspirin 81 mg EC twice a day for 12 days.  Patient take the medication with breakfast and dinner starting with dinner tonight.    He will remain off work for 2 weeks.  I will see him in the office after the MRI and further recommendations will be forthcoming.  I did state that he may require a podiatry surgery consult or consultation with Dr. Royal should he have ruptured all the ligaments.    Follow Up: No follow-ups on file.    Charly Arango MD         [1]   Allergies  Allergen Reactions    Ciprofloxacin OTHER (SEE COMMENTS)     Lower his seizure meds     Penicillins RASH    Sulfa Antibiotics RASH    Latex RASH

## 2025-02-18 ENCOUNTER — TELEPHONE (OUTPATIENT)
Dept: ORTHOPEDICS CLINIC | Facility: CLINIC | Age: 40
End: 2025-02-18

## 2025-02-18 ENCOUNTER — OFFICE VISIT (OUTPATIENT)
Dept: ORTHOPEDICS CLINIC | Facility: CLINIC | Age: 40
End: 2025-02-18
Payer: COMMERCIAL

## 2025-02-18 DIAGNOSIS — S93.422D SPRAIN OF DELTOID LIGAMENT OF LEFT ANKLE, SUBSEQUENT ENCOUNTER: ICD-10-CM

## 2025-02-18 DIAGNOSIS — S93.492D TEAR OF TALOFIBULAR LIGAMENT OF LEFT LOWER EXTREMITY, SUBSEQUENT ENCOUNTER: Primary | ICD-10-CM

## 2025-02-18 PROCEDURE — 99214 OFFICE O/P EST MOD 30 MIN: CPT | Performed by: ORTHOPAEDIC SURGERY

## 2025-02-18 NOTE — PROGRESS NOTES
NURSING INTAKE COMMENTS:   Chief Complaint   Patient presents with    Ankle Pain     Left f/u and MRI results - has a disc which we will upload in the system - walking with the CAM boot mostly at night rated as 5/10 on and off - bruising and swelling is less        HPI: This 40 year old male presents today with his mother to review the MRI of his left ankle due to a Worker's Compensation injury.  He stated that Is now denied the claim saying that he was a volunteer at the time.  He plans to contest this.  He said he has been off work since the accident.    Subjectively his left ankle feels a little better but not greatly better.  Swelling is decreased as well.  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      Past Medical History:    Depression    Epilepsy (HCC)    Seizure disorder ([facility])    Seizures (HCC)     Past Surgical History:   Procedure Laterality Date    Brain surgery Right 8/89    Subarachnoid Cyst    Other  1989    brain surgery     Current Outpatient Medications   Medication Sig Dispense Refill    aspirin 81 MG Oral Tab EC Take 1 tablet (81 mg total) by mouth 2 (two) times daily with meals. Start dinner tonight then breakfast/dinner for 12 days. 24 tablet 0    Sildenafil Citrate (VIAGRA) 100 MG Oral Tab Take 1 tablet (100 mg total) by mouth daily as needed for Erectile Dysfunction. 90 tablet 1    acetaminophen 325 MG Oral Tab Take 1 tablet (325 mg total) by mouth every 6 (six) hours as needed for Pain.      BRIVIACT 50 MG Oral Tab Take 50 mg by mouth nightly.  5    LORazepam 0.5 MG Oral Tab Take 1 tablet (0.5 mg total) by mouth as needed (prior to onset of seizure).  3    OXcarbazepine (TRILEPTAL OR) Take 900 mg by mouth. Pt takes 900 mg in am and 600mg at night.       Allergies[1]  Family History   Problem Relation Age of Onset    Thyroid Disorder Mother         hypothyroid    Cancer Maternal Grandmother         lung cancer    Lipids Maternal Grandmother         hyperlipidemia     Neurological Disorder Maternal Grandfather         Alzheimers    Diabetes Paternal Grandmother     Hypertension Paternal Grandmother     Cancer Paternal Grandmother         lung    Asthma Sister      No family Hx of DVT/PE    Social History     Occupational History    Not on file   Tobacco Use    Smoking status: Former    Smokeless tobacco: Never    Tobacco comments:     quit 2 weeks ago   Substance and Sexual Activity    Alcohol use: Yes     Alcohol/week: 0.0 standard drinks of alcohol     Comment: beer - rarely    Drug use: No    Sexual activity: Not on file        Review of Systems:  GENERAL: feels generally well, no significant weight loss or weight gain  SKIN: no ulcerated or worrisome skin lesions  EYES:denies blurred vision or double vision  HEENT: denies new nasal congestion, sinus pain or ST  LUNGS: denies shortness of breath  CARDIOVASCULAR: denies chest pain  GI: no hematemesis, no worsening heartburn, no diarrhea  : no dysuria, no blood in urine, no difficulty urinating, no incontinence  MUSCULOSKELETAL: no other musculoskeletal complaints other than in HPI  NEURO: no numbness or tingling, no weakness or balance disorder  PSYCHE: no depression or anxiety  HEMATOLOGIC: no hx of blood dyscrasia, no Hx DVT/PE  ENDOCRINE: no thyroid or diabetes issues  ALL/ASTHMA: no new hx of severe allergy or asthma    Physical Examination:    There were no vitals taken for this visit.  Constitutional: appears well hydrated, alert and responsive, no acute distress noted  Extremities: Left ankle with decreased swelling.  Skin glue is fully intact.  Decreased bruising and almost no ecchymosis.   Musculoskeletal: Less tenderness on the medial deltoid ligament than last week.  Anterior talofibular ligament still with moderate tenderness.  Shuck test less positive today.  Subtalar motion improved.  Still less than right ankle.  Neurological: Normal motor and sensory to the left ankle and foot and toes.    Imaging: I reviewed  the actual MRI images as well as the radiology report.  I agree the anterior talofibular ligament is completely ruptured.  The medial deltoid ligament is intact with mild sprain.  No loss of cartilage or osteochondral lesion.  No significant bony edema.      XR ANKLE WEIGHTBEARING (3 VIEWS), LEFT (CPT=73610)    Result Date: 2/15/2025  PROCEDURE: XR ANKLE WEIGHTBEARING (3 VIEWS), LEFT (CPT=73610)  COMPARISON: No comparison imaging available.  INDICATIONS: Recent injury playing basketball.  Deltoid ligament sprain measuring  TECHNIQUE: 3 weight-bearing views were obtained.   FINDINGS:  BONES: No acute fracture or dislocation..  Ankle mortise anatomic SOFT TISSUES: Negative. No visible soft tissue swelling. EFFUSION: None visible. OTHER: Negative.         CONCLUSION:  1. No acute fracture or dislocation..  Ankle mortise anatomic.    Dictated by (CST): Guy Powell MD on 2/15/2025 at 12:10 PM     Finalized by (CST): Guy Powell MD on 2/15/2025 at 12:13 PM             Lab Results   Component Value Date    WBC 3.8 (L) 07/13/2024    HGB 15.3 07/13/2024    .0 07/13/2024      Lab Results   Component Value Date    GLU 91 07/13/2024    BUN 8 (L) 07/13/2024    CREATSERUM 0.87 07/13/2024    GFRNAA 112 10/20/2021    GFRAA 129 10/20/2021        Assessment and Plan:  Diagnoses and all orders for this visit:    Tear of talofibular ligament of left lower extremity, subsequent encounter  -     PHYSICAL THERAPY - INTERNAL    Sprain of deltoid ligament of left ankle, subsequent encounter  -     PHYSICAL THERAPY - INTERNAL        Assessment: Left ankle sprain at work about 2 weeks out.    Plan: I discussed with him and his mother that at some point he may require an ankle stabilization surgery.  We will try nonoperative care however especially since the medial deltoid ligament is still intact.  We will start physical therapy.  He will be off until 2/25/2025.  At that point he can return to work with restrictions of  must wear walker boot, no running or jumping, and must elevate left leg 10 minutes every hour.  We will start a course of physical therapy and he should do the home exercise program.  I will see him in 4 weeks to check his progress.    Follow Up: No follow-ups on file.    Charly Arango MD         [1]   Allergies  Allergen Reactions    Ciprofloxacin OTHER (SEE COMMENTS)     Lower his seizure meds     Penicillins RASH    Sulfa Antibiotics RASH    Latex RASH

## 2025-02-24 NOTE — TELEPHONE ENCOUNTER
Received Family Medical Leave Act forms via e-mail with valid authorization Logged for processing,

## 2025-03-07 NOTE — TELEPHONE ENCOUNTER
Hi Dr. Arango,    Please sign off on form if you agree to: Continuous Family Medical Leave Act from 2/3/25 to 3/10/25, Return to work 3/11/25 due to left ankle pain pending re-eval.    -Signature page will be the first page scanned  -From your Inbasket, Highlight the patient and click Chart   -Double click the 2/18/25 Forms Completion telephone encounter  -Scroll down to the Media section   -Click the blue Hyperlink: Family Medical Leave Act, Dr. Arango, 3/7/25  -Click Acknowledge located in the top right ribbon/menu   -Drag the mouse into the blank space of the document and a + sign will appear. Left click to   electronically sign the document.  -Once signed, simply exit out of the screen and you signature will be saved.     Thank you,  Alla KISER

## 2025-03-12 NOTE — TELEPHONE ENCOUNTER
Forms completed and efaxed to Deepti Young/ [phone]. MD On-[employer] message sent, fax confirmed.

## 2025-03-12 NOTE — TELEPHONE ENCOUNTER
Hi Dr. Arango,    I am so sorry but there must have been some technical difficulties when signing pts form because your signature is not visible. When you have a moment can you please try signing again. I apologize for the inconvenience. I hope you are doing well!    Alla     Please sign off on form if you agree to: Continuous Family Medical Leave Act from 2/3/25 to 3/10/25, Return to work 3/11/25 due to left ankle pain pending re-eval.     -Signature page will be the first page scanned  -From your Inbasket, Highlight the patient and click Chart   -Double click the 2/18/25 Forms Completion telephone encounter  -Scroll down to the Media section   -Click the blue Hyperlink: Family Medical Leave Act, Dr. Arango, 3/7/25  -Click Acknowledge located in the top right ribbon/menu   -Drag the mouse into the blank space of the document and a + sign will appear. Left click to   electronically sign the document.  -Once signed, simply exit out of the screen and you signature will be saved.     Thank you,  Alal KISER

## 2025-07-09 ENCOUNTER — LAB ENCOUNTER (OUTPATIENT)
Dept: LAB | Facility: HOSPITAL | Age: 40
End: 2025-07-09
Attending: RADIOLOGY
Payer: COMMERCIAL

## 2025-07-09 DIAGNOSIS — Z51.81 ENCOUNTER FOR THERAPEUTIC DRUG MONITORING: Primary | ICD-10-CM

## 2025-07-09 DIAGNOSIS — G40.109 KOJEVNIKOV'S EPILEPSY (HCC): ICD-10-CM

## 2025-07-09 LAB
ALBUMIN SERPL-MCNC: 4.6 G/DL (ref 3.2–4.8)
ALBUMIN/GLOB SERPL: 1.9 {RATIO} (ref 1–2)
ALP LIVER SERPL-CCNC: 84 U/L (ref 45–117)
ALT SERPL-CCNC: 20 U/L (ref 10–49)
ANION GAP SERPL CALC-SCNC: 4 MMOL/L (ref 0–18)
AST SERPL-CCNC: 24 U/L (ref ?–34)
BILIRUB SERPL-MCNC: 0.4 MG/DL (ref 0.3–1.2)
BUN BLD-MCNC: 8 MG/DL (ref 9–23)
BUN/CREAT SERPL: 9.4 (ref 10–20)
CALCIUM BLD-MCNC: 8.9 MG/DL (ref 8.7–10.4)
CHLORIDE SERPL-SCNC: 96 MMOL/L (ref 98–112)
CO2 SERPL-SCNC: 31 MMOL/L (ref 21–32)
CREAT BLD-MCNC: 0.85 MG/DL (ref 0.7–1.3)
EGFRCR SERPLBLD CKD-EPI 2021: 112 ML/MIN/1.73M2 (ref 60–?)
FASTING STATUS PATIENT QL REPORTED: YES
GLOBULIN PLAS-MCNC: 2.4 G/DL (ref 2–3.5)
GLUCOSE BLD-MCNC: 94 MG/DL (ref 70–99)
OSMOLALITY SERPL CALC.SUM OF ELEC: 270 MOSM/KG (ref 275–295)
POTASSIUM SERPL-SCNC: 4.1 MMOL/L (ref 3.5–5.1)
PROT SERPL-MCNC: 7 G/DL (ref 5.7–8.2)
SODIUM SERPL-SCNC: 131 MMOL/L (ref 136–145)

## 2025-07-09 PROCEDURE — 80053 COMPREHEN METABOLIC PANEL: CPT

## 2025-07-09 PROCEDURE — 36415 COLL VENOUS BLD VENIPUNCTURE: CPT

## (undated) DIAGNOSIS — R19.7 DIARRHEA OF PRESUMED INFECTIOUS ORIGIN: Primary | ICD-10-CM

## (undated) NOTE — LETTER
2/11/2025          To Whom It May Concern:    Silver Gutierrez is currently under my medical care and may not return to work for 2 weeks.      If you require additional information please contact our office.        Sincerely,    Charly Arango MD

## (undated) NOTE — LETTER
AUTHORIZATION FOR SURGICAL OPERATION OR OTHER PROCEDURE    1. I hereby authorize Dr. Glen Starr, and [facility] staff assigned to my case to perform the following operation and/or procedure at the [facility]:    Right elbow aspiration/injection _______________________________________________________________________________________________      _______________________________________________________________________________________________    2. My physician has explained the nature and purpose of the operation or other procedure, possible alternative methods of treatment, the risks involved, and the possibility of complication to me. I acknowledge that no guarantee has been made as to the result that may be obtained. 3.  I recognize that, during the course of this operation, or other procedure, unforseen conditions may necessitate additional or different procedure than those listed above. I, therefore, further authorize and request that the above named physician, his/her physician assistants or designees perform such procedures as are, in his/her professional opinion, necessary and desirable. 4.  Any tissue or organs removed in the operation or other procedure may be disposed of by and at the discretion of the [facility] and [facility] AT [facility]. 5.  I understand that in the event of a medical emergency, I will be transported by local paramedics to [facility] or other \[facility]\"" emergency department. 6.  I certify that I have read and fully understand the above consent to operation and/or other procedure. 7.  I acknowledge that my physician has explained sedation/analgesia administration to me including the risks and benefits. I consent to the administration of sedation/analgesia as may be necessary or desirable in the judgement of my physician.     Witness signature: ___________________________________________________ Date:  ______/______/_____ Time:  ________ A. M.  P.M. Patient Name:  ______________________________________________________  (please print)      Patient signature:  ___________________________________________________             Relationship to Patient:           []  Parent    Responsible person                          []  Spouse  In case of minor or                    [] Other  _____________   Incompetent name:  __________________________________________________                               (please print)      _____________      Responsible person  In case of minor or  Incompetent signature:  _______________________________________________    Statement of Physician  My signature below affirms that prior to the time of the procedure, I have explained to the patient and/or his/her guardian, the risks and benefits involved in the proposed treatment and any reasonable alternative to the proposed treatment. I have also explained the risks and benefits involved in the refusal of the proposed treatment and have answered the patient's questions.                         Date:  ______/______/_______  Provider                      Signature:  __________________________________________________________       Time:  ___________ A.M    P.M.

## (undated) NOTE — LETTER
Patient Name: Irina Sanchez  : 1985  MRN: [id number]  Patient Address: [facility] [address]      Coronavirus Disease 2019 (COVID-19)     [facility] is committed to the safety and well-being of our patients, members, Rafael Ped If your symptoms get worse, call your healthcare provider immediately. 3. Get rest and stay hydrated.    4. If you have a medical appointment, call the healthcare provider ahead of time and tell them that you have or may have COVID-19.  5. For medical deon fever-reducing medications; and  · Improvement in respiratory symptoms (e.g., cough, shortness of breath); and  · At least 10 days have passed since symptoms first appeared OR if asymptomatic patient or date of symptom onset is unclear then use 10 days pos donors must:    · Have had a confirmed diagnosis of COVID-19  · Be symptom-free for at least 14 days*    *Some people will be required to have a repeat COVID-19 test in order to be eligible to donate.  If you’re instructed by [employer] that a repeat test is r random. Researchers are trying to identify similarities between people with a Post-COVID condition to better understand if there are risk factors. How do I prevent a Post-COVID condition?   The best way to prevent the long-term symptoms of COVID-19 is

## (undated) NOTE — LETTER
Date: 10/8/2023    Patient Name: Cornell Bradshaw          To Whom it may concern: This letter has been written at the patient's request. The above patient was seen at the [facility] for treatment of a medical condition. This patient should be excused from attending work on 10/10/2023. The patient may return to work on 10/11/2023.         Sincerely,          GORGE Johns

## (undated) NOTE — LETTER
Patient Name: Merrill Woody  : 1985  MRN: [id number]  Patient Address: [facility] [address]      Coronavirus Disease 2019 (COVID-19)     [facility] is committed to the safety and well-being of our patients, members, Rola Cruz If your symptoms get worse, call your healthcare provider immediately. 3. Get rest and stay hydrated.    4. If you have a medical appointment, call the healthcare provider ahead of time and tell them that you have or may have COVID-19.  5. For medical deon fever-reducing medications; and  · Improvement in respiratory symptoms (e.g., cough, shortness of breath); and  · At least 10 days have passed since symptoms first appeared OR if asymptomatic patient or date of symptom onset is unclear then use 10 days pos donors must:    · Have had a confirmed diagnosis of COVID-19  · Be symptom-free for at least 14 days*    *Some people will be required to have a repeat COVID-19 test in order to be eligible to donate.  If you’re instructed by [employer] that a repeat test is r random. Researchers are trying to identify similarities between people with a Post-COVID condition to better understand if there are risk factors. How do I prevent a Post-COVID condition?   The best way to prevent the long-term symptoms of COVID-19 is

## (undated) NOTE — MR AVS SNAPSHOT
[facility]  [address] [place] [address]  [phone]               Thank you for choosing us for your health care visit with Cookie Tucker. Mayco Key MD.  We are glad to serve you and happy to provide you with this summary of your visit. Enter your [employer] Activation Code exactly as it appears below along with your Zip Code and Date of Birth to complete the sign-up process. If you do not sign up before the expiration date, you must request a new code.     Your unique [employer] Access Code: [email or web address] Visit [facility] online at  [email or web address]

## (undated) NOTE — LETTER
2/18/2025          To Whom It May Concern:    Silver Gutierrez is currently under my medical care and may not return to work until Tuesday 2/25/2025 with the following restrictions: must wear the CAM boot at all the time; no lifting more than 10 lb; no running and jumping; needs to elevate his foot every hour for 10 minutes.  This restrictions are in place for 4 weeks.     If you require additional information please contact our office.        Sincerely,    Charly Arango MD

## (undated) NOTE — LETTER
11/12/2020              Augustin Tenorio        [address] [address]         To Whom it May Concern,         Poonam Zarate is currently under my medical care.  Due to his medical condition, he may not return to work until November 30,

## (undated) NOTE — LETTER
AUTHORIZATION FOR SURGICAL OPERATION OR OTHER PROCEDURE    1. I hereby authorize Dr. Shelbie Greenwood PA-C, and [facility] staff assigned to my case to perform the following operation and/or procedure at the [employer] [facility] [place]:    _______________________________________________________________________________________________    Cortisone and aspiration to right elbow  _______________________________________________________________________________________________    2. My physician has explained the nature and purpose of the operation or other procedure, possible alternative methods of treatment, the risks involved, and the possibility of complication to me. I acknowledge that no guarantee has been made as to the result that may be obtained. 3.  I recognize that, during the course of this operation, or other procedure, unforseen conditions may necessitate additional or different procedure than those listed above. I, therefore, further authorize and request that the above named physician, his/her physician assistants or designees perform such procedures as are, in his/her professional opinion, necessary and desirable. 4.  Any tissue or organs removed in the operation or other procedure may be disposed of by and at the discretion of the [facility] [employer] [facility] and [facility] AT [facility]. 5.  I understand that in the event of a medical emergency, I will be transported by local paramedics to [facility] or other hospital emergency department. 6.  I certify that I have read and fully understand the above consent to operation and/or other procedure. 7.  I acknowledge that my physician has explained sedation/analgesia administration to me including the risks and benefits. I consent to the administration of sedation/analgesia as may be necessary or desirable in the judgement of my physician.     Witness signature: ___________________________________________________ Date: ______/______/_____                    Time:  ________ A. M.  P.M. Patient Name:  ______________________________________________________  (please print)      Patient signature:  ___________________________________________________             Relationship to Patient:           []  Parent    Responsible person                          []  Spouse  In case of minor or                    [] Other  _____________   Incompetent name:  __________________________________________________                               (please print)      _____________      Responsible person  In case of minor or  Incompetent signature:  _______________________________________________    Statement of Physician  My signature below affirms that prior to the time of the procedure, I have explained to the patient and/or his/her guardian, the risks and benefits involved in the proposed treatment and any reasonable alternative to the proposed treatment. I have also explained the risks and benefits involved in the refusal of the proposed treatment and have answered the patient's questions.                         Date:  ______/______/_______  Provider                      Signature:  __________________________________________________________       Time:  ___________ A.M    P.M.

## (undated) NOTE — LETTER
12/27/2017              Taran Israel        [address] [address]         To whom this may concern,    Taran Israel is under my medical care and needs a vision test, a hearing test, and a color vision test. Please provide him with a

## (undated) NOTE — LETTER
5/2/2023          To Whom It May Concern:    Todd Hand is currently under my medical care and may return to work on Thursday 5/4/2023 with no restrictions. If you require additional information please contact our office.         Sincerely,    Dayton Joyce MD

## (undated) NOTE — LETTER
4/21/2023          To Whom It May Concern:    Duc Valerio is currently under my medical care and was seen in my office today 4/21/23. Please excuse him until Monday 4/24/23. If you require additional information please contact our office. Sincerely,    Elle Pitts.  Bree Pedro MD

## (undated) NOTE — LETTER
AUTHORIZATION FOR SURGICAL OPERATION OR OTHER PROCEDURE    1. I hereby authorize Dr. Fernando Dai [facility], and [employer] [facility] staff assigned to my case to perform the following operation and/or procedure at the [employer]:    ___________________________Right elbow aspiration and cortisone injection ____________________________________________________________________      _______________________________________________________________________________________________    2. My physician has explained the nature and purpose of the operation or other procedure, possible alternative methods of treatment, the risks involved, and the possibility of complication to me. I acknowledge that no guarantee has been made as to the result that may be obtained. 3.  I recognize that, during the course of this operation, or other procedure, unforseen conditions may necessitate additional or different procedure than those listed above. I, therefore, further authorize and request that the above named physician, his/her physician assistants or designees perform such procedures as are, in his/her professional opinion, necessary and desirable. 4.  Any tissue or organs removed in the operation or other procedure may be disposed of by and at the discretion of the [employer] [facility] and [address]. 5.  I understand that in the event of a medical emergency, I will be transported by local paramedics to [facility] or other [facility] emergency department. 6.  I certify that I have read and fully understand the above consent to operation and/or other procedure. 7.  I acknowledge that my physician has explained sedation/analgesia administration to me including the risks and benefits. I consent to the administration of sedation/analgesia as may be necessary or desirable in the judgement of my physician.     Witness signature: ___________________________________________________ Date: ______/______/_____                    Time:  ________ A. M.  P.M. Patient Name:  ______________________________________________________  (please print)      Patient signature:  ___________________________________________________             Relationship to Patient:           []  Parent    Responsible person                          []  Spouse  In case of minor or                    [] Other  _____________   Incompetent name:  __________________________________________________                               (please print)      _____________      Responsible person  In case of minor or  Incompetent signature:  _______________________________________________    Statement of Physician  My signature below affirms that prior to the time of the procedure, I have explained to the patient and/or his/her guardian, the risks and benefits involved in the proposed treatment and any reasonable alternative to the proposed treatment. I have also explained the risks and benefits involved in the refusal of the proposed treatment and have answered the patient's questions.                         Date:  ______/______/_______  Provider                      Signature:  __________________________________________________________       Time:  ___________ A.M    P.M.

## (undated) NOTE — LETTER
5/2/2023          To Whom It May Concern:    Gerardo Ward is currently under my medical care and may not return to {em school/work:711123233} at this time. Please excuse Briana Loo for {NUMBERS 0-10:3282} {days weeks:3323::\"days\"}. {HE/SHE :6250} may return to {em school/work:596349670} on ***. Activity is restricted as follows: {EM SCHOOL/WORK RESTRICTIONS:829572378}. If you require additional information please contact our office.         Sincerely,    Jasmin Wallace MD          Document generated by:  Inessa Valiente

## (undated) NOTE — MR AVS SNAPSHOT
[facility]  [address] [place] [address]  [phone]               Thank you for choosing us for your health care visit with Isidro Blank. rAlyn Marley MD.  We are glad to serve you and happy to provide you with this summary of your visit. insurance company's guidelines for prior authorization for this test.  You may be held responsible for payment in full if proper authorization is not acquired. Please contact the Patient Business Office at [phone] if you have any questions related to i Your unique [employer] Access Code: Brina Dumont  Expires: 7/4/2017  6:21 PM    If you have questions, you can call [phone] to talk to our [facility] Staff. Remember, [employer] is NOT to be used for urgent needs. For medical emergencies, dial 911.